# Patient Record
Sex: FEMALE | Race: WHITE | NOT HISPANIC OR LATINO | Employment: OTHER | URBAN - NONMETROPOLITAN AREA
[De-identification: names, ages, dates, MRNs, and addresses within clinical notes are randomized per-mention and may not be internally consistent; named-entity substitution may affect disease eponyms.]

---

## 2017-03-16 LAB
INR BLD: 1.41 (ref 0.88–1.18)
PROTHROMBIN TIME: 17.1 SEC (ref 12–14.6)

## 2017-08-04 LAB
BACTERIA: NEGATIVE /HPF
BILIRUBIN URINE: NEGATIVE
BLOOD, URINE: NEGATIVE
CLARITY: CLEAR
COLOR: YELLOW
EPITHELIAL CELLS, UA: 1 /HPF (ref 0–5)
GLUCOSE URINE: NEGATIVE MG/DL
HYALINE CASTS: 0 /HPF (ref 0–8)
INR BLD: 2.13 (ref 0.88–1.18)
KETONES, URINE: NEGATIVE MG/DL
LEUKOCYTE ESTERASE, URINE: ABNORMAL
NITRITE, URINE: NEGATIVE
PH UA: 6
PROTEIN UA: NEGATIVE MG/DL
PROTHROMBIN TIME: 24 SEC (ref 12–14.6)
RBC UA: 0 /HPF (ref 0–4)
SPECIFIC GRAVITY UA: 1.01
UROBILINOGEN, URINE: 0.2 E.U./DL
WBC UA: 2 /HPF (ref 0–5)

## 2017-08-06 LAB — URINE CULTURE, ROUTINE: NORMAL

## 2017-09-01 LAB
BACTERIA: ABNORMAL /HPF
BILIRUBIN URINE: ABNORMAL
BLOOD, URINE: ABNORMAL
CLARITY: ABNORMAL
COLOR: ABNORMAL
EPITHELIAL CELLS, UA: 1 /HPF (ref 0–5)
EPITHELIAL CELLS, UA: ABNORMAL /HPF
GLUCOSE URINE: NEGATIVE MG/DL
HYALINE CASTS: 1 /HPF (ref 0–8)
INR BLD: 2.52 (ref 0.88–1.18)
KETONES, URINE: ABNORMAL MG/DL
LEUKOCYTE ESTERASE, URINE: ABNORMAL
NITRITE, URINE: POSITIVE
PH UA: 5
PROTEIN UA: ABNORMAL MG/DL
PROTHROMBIN TIME: 27.4 SEC (ref 12–14.6)
RBC UA: 2 /HPF (ref 0–4)
SPECIFIC GRAVITY UA: 1.01
UROBILINOGEN, URINE: 1 E.U./DL
WBC UA: 359 /HPF (ref 0–5)
WBC UA: ABNORMAL /HPF (ref 0–5)

## 2017-09-03 LAB
ORGANISM: ABNORMAL
URINE CULTURE, ROUTINE: ABNORMAL
URINE CULTURE, ROUTINE: ABNORMAL

## 2017-10-27 LAB
ALBUMIN SERPL-MCNC: 4.3 G/DL (ref 3.5–5.2)
ALP BLD-CCNC: 67 U/L (ref 35–104)
ALT SERPL-CCNC: 12 U/L (ref 5–33)
ANION GAP SERPL CALCULATED.3IONS-SCNC: 9 MMOL/L (ref 7–19)
AST SERPL-CCNC: 19 U/L (ref 5–32)
BACTERIA: ABNORMAL /HPF
BASOPHILS ABSOLUTE: 0 K/UL (ref 0–0.2)
BASOPHILS RELATIVE PERCENT: 0.3 % (ref 0–1)
BILIRUB SERPL-MCNC: 0.3 MG/DL (ref 0.2–1.2)
BILIRUBIN URINE: NEGATIVE
BLOOD, URINE: NEGATIVE
BUN BLDV-MCNC: 20 MG/DL (ref 8–23)
CALCIUM SERPL-MCNC: 9 MG/DL (ref 8.2–9.6)
CHLORIDE BLD-SCNC: 104 MMOL/L (ref 98–111)
CLARITY: ABNORMAL
CO2: 30 MMOL/L (ref 22–29)
COLOR: ABNORMAL
CREAT SERPL-MCNC: 1.2 MG/DL (ref 0.5–0.9)
EOSINOPHILS ABSOLUTE: 0.1 K/UL (ref 0–0.6)
EOSINOPHILS RELATIVE PERCENT: 1.4 % (ref 0–5)
EPITHELIAL CELLS, UA: 0 /HPF (ref 0–5)
GFR NON-AFRICAN AMERICAN: 42
GLUCOSE BLD-MCNC: 93 MG/DL (ref 74–109)
GLUCOSE URINE: NEGATIVE MG/DL
HCT VFR BLD CALC: 39.5 % (ref 37–47)
HEMOGLOBIN: 12.1 G/DL (ref 12–16)
HYALINE CASTS: 1 /HPF (ref 0–8)
INR BLD: 1.1 (ref 0.88–1.18)
KETONES, URINE: NEGATIVE MG/DL
LEUKOCYTE ESTERASE, URINE: ABNORMAL
LYMPHOCYTES ABSOLUTE: 0.8 K/UL (ref 1.1–4.5)
LYMPHOCYTES RELATIVE PERCENT: 12.3 % (ref 20–40)
MCH RBC QN AUTO: 28.7 PG (ref 27–31)
MCHC RBC AUTO-ENTMCNC: 30.6 G/DL (ref 33–37)
MCV RBC AUTO: 93.8 FL (ref 81–99)
MONOCYTES ABSOLUTE: 0.5 K/UL (ref 0–0.9)
MONOCYTES RELATIVE PERCENT: 8.1 % (ref 0–10)
NEUTROPHILS ABSOLUTE: 5.1 K/UL (ref 1.5–7.5)
NEUTROPHILS RELATIVE PERCENT: 77.4 % (ref 50–65)
NITRITE, URINE: POSITIVE
PDW BLD-RTO: 14 % (ref 11.5–14.5)
PH UA: 6
PLATELET # BLD: 745 K/UL (ref 130–400)
PMV BLD AUTO: 10.1 FL (ref 9.4–12.3)
POTASSIUM SERPL-SCNC: 3.7 MMOL/L (ref 3.5–5)
PROTEIN UA: NEGATIVE MG/DL
PROTHROMBIN TIME: 14.1 SEC (ref 12–14.6)
RBC # BLD: 4.21 M/UL (ref 4.2–5.4)
RBC UA: 2 /HPF (ref 0–4)
SODIUM BLD-SCNC: 143 MMOL/L (ref 136–145)
SPECIFIC GRAVITY UA: 1.01
TOTAL PROTEIN: 7 G/DL (ref 6.6–8.7)
UROBILINOGEN, URINE: 1 E.U./DL
WBC # BLD: 6.6 K/UL (ref 4.8–10.8)
WBC UA: 130 /HPF (ref 0–5)

## 2017-10-29 LAB
ORGANISM: ABNORMAL
URINE CULTURE, ROUTINE: ABNORMAL
URINE CULTURE, ROUTINE: ABNORMAL

## 2018-01-31 LAB
BILIRUBIN URINE: NEGATIVE
BLOOD, URINE: NEGATIVE
CLARITY: CLEAR
COLOR: YELLOW
GLUCOSE URINE: NEGATIVE MG/DL
INR BLD: 1.1 (ref 0.88–1.18)
KETONES, URINE: NEGATIVE MG/DL
LEUKOCYTE ESTERASE, URINE: NEGATIVE
NITRITE, URINE: NEGATIVE
PH UA: 6
PROTEIN UA: NEGATIVE MG/DL
PROTHROMBIN TIME: 14.1 SEC (ref 12–14.6)
SPECIFIC GRAVITY UA: 1.01
UROBILINOGEN, URINE: 0.2 E.U./DL

## 2018-02-16 LAB
BACTERIA: NEGATIVE /HPF
BILIRUBIN URINE: NEGATIVE
BLOOD, URINE: NEGATIVE
CLARITY: CLEAR
COLOR: YELLOW
EPITHELIAL CELLS, UA: 4 /HPF (ref 0–5)
GLUCOSE URINE: NEGATIVE MG/DL
HYALINE CASTS: 2 /HPF (ref 0–8)
INR BLD: 1.21 (ref 0.88–1.18)
KETONES, URINE: NEGATIVE MG/DL
LEUKOCYTE ESTERASE, URINE: ABNORMAL
NITRITE, URINE: NEGATIVE
PH UA: 5.5
PROTEIN UA: NEGATIVE MG/DL
PROTHROMBIN TIME: 15.2 SEC (ref 12–14.6)
RBC UA: 1 /HPF (ref 0–4)
SPECIFIC GRAVITY UA: 1.01
URINE REFLEX TO CULTURE: YES
UROBILINOGEN, URINE: 0.2 E.U./DL
WBC UA: 14 /HPF (ref 0–5)

## 2018-02-18 LAB — URINE CULTURE, ROUTINE: NORMAL

## 2018-03-23 LAB
BACTERIA: NEGATIVE /HPF
BILIRUBIN URINE: NEGATIVE
BLOOD, URINE: NEGATIVE
CLARITY: CLEAR
COLOR: YELLOW
EPITHELIAL CELLS, UA: 1 /HPF (ref 0–5)
GLUCOSE URINE: NEGATIVE MG/DL
HYALINE CASTS: 6 /HPF (ref 0–8)
INR BLD: 1.2 (ref 0.88–1.18)
KETONES, URINE: NEGATIVE MG/DL
LEUKOCYTE ESTERASE, URINE: ABNORMAL
NITRITE, URINE: NEGATIVE
PH UA: 6
PROTEIN UA: NEGATIVE MG/DL
PROTHROMBIN TIME: 15.1 SEC (ref 12–14.6)
RBC UA: 1 /HPF (ref 0–4)
SPECIFIC GRAVITY UA: 1.02
UROBILINOGEN, URINE: 0.2 E.U./DL
WBC UA: 13 /HPF (ref 0–5)

## 2018-03-25 LAB — URINE CULTURE, ROUTINE: NORMAL

## 2018-07-13 LAB
BACTERIA: NEGATIVE /HPF
BILIRUBIN URINE: NEGATIVE
BLOOD, URINE: NEGATIVE
CLARITY: CLEAR
COLOR: YELLOW
EPITHELIAL CELLS, UA: 1 /HPF (ref 0–5)
GLUCOSE URINE: NEGATIVE MG/DL
HYALINE CASTS: 1 /HPF (ref 0–8)
INR BLD: 1.31 (ref 0.88–1.18)
KETONES, URINE: NEGATIVE MG/DL
LEUKOCYTE ESTERASE, URINE: ABNORMAL
NITRITE, URINE: NEGATIVE
PH UA: 6
PROTEIN UA: NEGATIVE MG/DL
PROTHROMBIN TIME: 16.2 SEC (ref 12–14.6)
RBC UA: 1 /HPF (ref 0–4)
SPECIFIC GRAVITY UA: 1.01
URINE REFLEX TO CULTURE: YES
UROBILINOGEN, URINE: 0.2 E.U./DL
WBC UA: 7 /HPF (ref 0–5)

## 2018-07-15 LAB
ORGANISM: ABNORMAL
URINE CULTURE, ROUTINE: ABNORMAL
URINE CULTURE, ROUTINE: ABNORMAL

## 2018-08-03 LAB
ALBUMIN SERPL-MCNC: 4.4 G/DL (ref 3.5–5.2)
ALP BLD-CCNC: 59 U/L (ref 35–104)
ALT SERPL-CCNC: 10 U/L (ref 5–33)
ANION GAP SERPL CALCULATED.3IONS-SCNC: 17 MMOL/L (ref 7–19)
AST SERPL-CCNC: 17 U/L (ref 5–32)
BACTERIA: ABNORMAL /HPF
BASOPHILS ABSOLUTE: 0 K/UL (ref 0–0.2)
BASOPHILS RELATIVE PERCENT: 0.1 % (ref 0–1)
BILIRUB SERPL-MCNC: 0.3 MG/DL (ref 0.2–1.2)
BILIRUBIN URINE: NEGATIVE
BLOOD, URINE: NEGATIVE
BUN BLDV-MCNC: 20 MG/DL (ref 8–23)
CALCIUM SERPL-MCNC: 9.2 MG/DL (ref 8.2–9.6)
CHLORIDE BLD-SCNC: 102 MMOL/L (ref 98–111)
CLARITY: ABNORMAL
CO2: 24 MMOL/L (ref 22–29)
COLOR: YELLOW
CREAT SERPL-MCNC: 1.1 MG/DL (ref 0.5–0.9)
EOSINOPHILS ABSOLUTE: 0.1 K/UL (ref 0–0.6)
EOSINOPHILS RELATIVE PERCENT: 1.9 % (ref 0–5)
EPITHELIAL CELLS, UA: 0 /HPF (ref 0–5)
GFR NON-AFRICAN AMERICAN: 46
GLUCOSE BLD-MCNC: 125 MG/DL (ref 74–109)
GLUCOSE URINE: NEGATIVE MG/DL
HCT VFR BLD CALC: 36.4 % (ref 37–47)
HEMOGLOBIN: 11.7 G/DL (ref 12–16)
HYALINE CASTS: 2 /HPF (ref 0–8)
INR BLD: 1.15 (ref 0.88–1.18)
KETONES, URINE: NEGATIVE MG/DL
LEUKOCYTE ESTERASE, URINE: ABNORMAL
LYMPHOCYTES ABSOLUTE: 0.8 K/UL (ref 1.1–4.5)
LYMPHOCYTES RELATIVE PERCENT: 11 % (ref 20–40)
MCH RBC QN AUTO: 29.3 PG (ref 27–31)
MCHC RBC AUTO-ENTMCNC: 32.1 G/DL (ref 33–37)
MCV RBC AUTO: 91.2 FL (ref 81–99)
MONOCYTES ABSOLUTE: 0.5 K/UL (ref 0–0.9)
MONOCYTES RELATIVE PERCENT: 7.8 % (ref 0–10)
NEUTROPHILS ABSOLUTE: 5.5 K/UL (ref 1.5–7.5)
NEUTROPHILS RELATIVE PERCENT: 78.9 % (ref 50–65)
NITRITE, URINE: NEGATIVE
PDW BLD-RTO: 14.4 % (ref 11.5–14.5)
PH UA: 6
PLATELET # BLD: 606 K/UL (ref 130–400)
PMV BLD AUTO: 10 FL (ref 9.4–12.3)
POTASSIUM SERPL-SCNC: 4 MMOL/L (ref 3.5–5)
PROTEIN UA: NEGATIVE MG/DL
PROTHROMBIN TIME: 14.6 SEC (ref 12–14.6)
RBC # BLD: 3.99 M/UL (ref 4.2–5.4)
RBC UA: 1 /HPF (ref 0–4)
SODIUM BLD-SCNC: 143 MMOL/L (ref 136–145)
SPECIFIC GRAVITY UA: 1.02
TOTAL PROTEIN: 6.4 G/DL (ref 6.6–8.7)
UROBILINOGEN, URINE: 0.2 E.U./DL
WBC # BLD: 6.9 K/UL (ref 4.8–10.8)
WBC UA: 68 /HPF (ref 0–5)

## 2018-08-24 LAB
INR BLD: 1.19 (ref 0.88–1.18)
PROTHROMBIN TIME: 15 SEC (ref 12–14.6)

## 2018-09-07 LAB
BILIRUBIN URINE: NEGATIVE
BLOOD, URINE: NEGATIVE
CLARITY: CLEAR
COLOR: YELLOW
GLUCOSE URINE: NEGATIVE MG/DL
INR BLD: 1.35 (ref 0.88–1.18)
KETONES, URINE: NEGATIVE MG/DL
LEUKOCYTE ESTERASE, URINE: NEGATIVE
NITRITE, URINE: NEGATIVE
PH UA: 6
PROTEIN UA: NEGATIVE MG/DL
PROTHROMBIN TIME: 16.6 SEC (ref 12–14.6)
SPECIFIC GRAVITY UA: 1.02
URINE REFLEX TO CULTURE: NORMAL
UROBILINOGEN, URINE: 0.2 E.U./DL

## 2018-10-12 LAB
BILIRUBIN URINE: NEGATIVE
BLOOD, URINE: NEGATIVE
CLARITY: CLEAR
COLOR: YELLOW
GLUCOSE URINE: NEGATIVE MG/DL
INR BLD: 1.4 (ref 0.88–1.18)
KETONES, URINE: NEGATIVE MG/DL
LEUKOCYTE ESTERASE, URINE: NEGATIVE
NITRITE, URINE: NEGATIVE
PH UA: 6
PROTEIN UA: NEGATIVE MG/DL
PROTHROMBIN TIME: 17.1 SEC (ref 12–14.6)
SPECIFIC GRAVITY UA: 1.01
URINE REFLEX TO CULTURE: NORMAL
UROBILINOGEN, URINE: 0.2 E.U./DL

## 2019-01-04 LAB
ANION GAP SERPL CALCULATED.3IONS-SCNC: 13 MMOL/L (ref 7–19)
BASOPHILS ABSOLUTE: 0 K/UL (ref 0–0.2)
BASOPHILS RELATIVE PERCENT: 0.1 % (ref 0–1)
BILIRUBIN URINE: NEGATIVE
BLOOD, URINE: NEGATIVE
BUN BLDV-MCNC: 18 MG/DL (ref 8–23)
CALCIUM SERPL-MCNC: 9 MG/DL (ref 8.2–9.6)
CHLORIDE BLD-SCNC: 102 MMOL/L (ref 98–111)
CLARITY: CLEAR
CO2: 29 MMOL/L (ref 22–29)
COLOR: YELLOW
CREAT SERPL-MCNC: 1.1 MG/DL (ref 0.5–0.9)
EOSINOPHILS ABSOLUTE: 0.1 K/UL (ref 0–0.6)
EOSINOPHILS RELATIVE PERCENT: 1.5 % (ref 0–5)
GFR NON-AFRICAN AMERICAN: 46
GLUCOSE BLD-MCNC: 102 MG/DL (ref 74–109)
GLUCOSE URINE: NEGATIVE MG/DL
HCT VFR BLD CALC: 38.7 % (ref 37–47)
HEMOGLOBIN: 12 G/DL (ref 12–16)
INR BLD: 1.64 (ref 0.88–1.18)
KETONES, URINE: NEGATIVE MG/DL
LEUKOCYTE ESTERASE, URINE: NEGATIVE
LYMPHOCYTES ABSOLUTE: 0.7 K/UL (ref 1.1–4.5)
LYMPHOCYTES RELATIVE PERCENT: 9.1 % (ref 20–40)
MCH RBC QN AUTO: 28.6 PG (ref 27–31)
MCHC RBC AUTO-ENTMCNC: 31 G/DL (ref 33–37)
MCV RBC AUTO: 92.1 FL (ref 81–99)
MONOCYTES ABSOLUTE: 0.6 K/UL (ref 0–0.9)
MONOCYTES RELATIVE PERCENT: 7.9 % (ref 0–10)
NEUTROPHILS ABSOLUTE: 6.6 K/UL (ref 1.5–7.5)
NEUTROPHILS RELATIVE PERCENT: 81 % (ref 50–65)
NITRITE, URINE: NEGATIVE
PDW BLD-RTO: 14.5 % (ref 11.5–14.5)
PH UA: 6
PLATELET # BLD: 601 K/UL (ref 130–400)
PMV BLD AUTO: 10.2 FL (ref 9.4–12.3)
POTASSIUM SERPL-SCNC: 4.1 MMOL/L (ref 3.5–5)
PROTEIN UA: NEGATIVE MG/DL
PROTHROMBIN TIME: 18.7 SEC (ref 12–14.6)
RBC # BLD: 4.2 M/UL (ref 4.2–5.4)
SODIUM BLD-SCNC: 144 MMOL/L (ref 136–145)
SPECIFIC GRAVITY UA: 1.01
UROBILINOGEN, URINE: 0.2 E.U./DL
WBC # BLD: 8.1 K/UL (ref 4.8–10.8)

## 2019-02-01 LAB
BACTERIA: ABNORMAL /HPF
BILIRUBIN URINE: NEGATIVE
BLOOD, URINE: NEGATIVE
CLARITY: ABNORMAL
COLOR: YELLOW
EPITHELIAL CELLS, UA: 0 /HPF (ref 0–5)
GLUCOSE URINE: NEGATIVE MG/DL
HYALINE CASTS: 1 /HPF (ref 0–8)
INR BLD: 1.45 (ref 0.88–1.18)
KETONES, URINE: NEGATIVE MG/DL
LEUKOCYTE ESTERASE, URINE: ABNORMAL
NITRITE, URINE: NEGATIVE
PH UA: 6
PROTEIN UA: NEGATIVE MG/DL
PROTHROMBIN TIME: 17 SEC (ref 12–14.6)
RBC UA: 1 /HPF (ref 0–4)
SPECIFIC GRAVITY UA: 1.01
URINE REFLEX TO CULTURE: YES
UROBILINOGEN, URINE: 0.2 E.U./DL
WBC UA: 63 /HPF (ref 0–5)

## 2019-02-04 LAB
ORGANISM: ABNORMAL
URINE CULTURE, ROUTINE: ABNORMAL
URINE CULTURE, ROUTINE: ABNORMAL

## 2019-03-01 LAB
BACTERIA: NEGATIVE /HPF
BILIRUBIN URINE: NEGATIVE
BLOOD, URINE: ABNORMAL
CLARITY: ABNORMAL
COLOR: YELLOW
EPITHELIAL CELLS, UA: 0 /HPF (ref 0–5)
GLUCOSE URINE: NEGATIVE MG/DL
HYALINE CASTS: 1 /HPF (ref 0–8)
INR BLD: 1.38 (ref 0.88–1.18)
KETONES, URINE: NEGATIVE MG/DL
LEUKOCYTE ESTERASE, URINE: ABNORMAL
NITRITE, URINE: NEGATIVE
PH UA: 5.5
PROTEIN UA: NEGATIVE MG/DL
PROTHROMBIN TIME: 16.3 SEC (ref 12–14.6)
RBC UA: 1 /HPF (ref 0–4)
SPECIFIC GRAVITY UA: 1.01
URINE REFLEX TO CULTURE: YES
UROBILINOGEN, URINE: 0.2 E.U./DL
WBC UA: 84 /HPF (ref 0–5)

## 2019-03-03 LAB
ORGANISM: ABNORMAL
URINE CULTURE, ROUTINE: ABNORMAL
URINE CULTURE, ROUTINE: ABNORMAL

## 2019-03-04 ENCOUNTER — APPOINTMENT (OUTPATIENT)
Dept: CT IMAGING | Facility: HOSPITAL | Age: 84
End: 2019-03-04

## 2019-03-04 ENCOUNTER — HOSPITAL ENCOUNTER (OUTPATIENT)
Facility: HOSPITAL | Age: 84
Setting detail: OBSERVATION
Discharge: HOME HEALTH | End: 2019-03-06
Attending: FAMILY MEDICINE | Admitting: FAMILY MEDICINE

## 2019-03-04 PROBLEM — B96.29 URINARY TRACT INFECTION DUE TO EXTENDED-SPECTRUM BETA LACTAMASE (ESBL) PRODUCING ESCHERICHIA COLI: Status: ACTIVE | Noted: 2019-03-04

## 2019-03-04 PROBLEM — Z16.12 URINARY TRACT INFECTION DUE TO EXTENDED-SPECTRUM BETA LACTAMASE (ESBL) PRODUCING ESCHERICHIA COLI: Status: ACTIVE | Noted: 2019-03-04

## 2019-03-04 PROBLEM — C18.9 COLON CANCER (HCC): Chronic | Status: ACTIVE | Noted: 2019-03-04

## 2019-03-04 PROBLEM — H93.19 SUBJECTIVE TINNITUS: Chronic | Status: ACTIVE | Noted: 2019-03-04

## 2019-03-04 PROBLEM — D75.9 DISEASE OF BLOOD AND BLOOD FORMING ORGAN: Chronic | Status: ACTIVE | Noted: 2019-03-04

## 2019-03-04 PROBLEM — I82.4Z3: Chronic | Status: ACTIVE | Noted: 2019-03-04

## 2019-03-04 PROBLEM — M81.0 OSTEOPOROSIS, POST-MENOPAUSAL: Chronic | Status: ACTIVE | Noted: 2019-03-04

## 2019-03-04 PROBLEM — D53.9 SIMPLE CHRONIC ANEMIA: Chronic | Status: ACTIVE | Noted: 2019-03-04

## 2019-03-04 PROBLEM — N18.2 CHRONIC KIDNEY DISEASE, STAGE II (MILD): Chronic | Status: ACTIVE | Noted: 2019-03-04

## 2019-03-04 PROBLEM — R42 DIZZINESS AND GIDDINESS: Chronic | Status: ACTIVE | Noted: 2019-03-04

## 2019-03-04 PROBLEM — K21.9 ESOPHAGEAL REFLUX: Chronic | Status: ACTIVE | Noted: 2019-03-04

## 2019-03-04 PROBLEM — G25.2 ACTION TREMOR: Chronic | Status: ACTIVE | Noted: 2019-03-04

## 2019-03-04 PROBLEM — N39.0 URINARY TRACT INFECTION DUE TO EXTENDED-SPECTRUM BETA LACTAMASE (ESBL) PRODUCING ESCHERICHIA COLI: Status: ACTIVE | Noted: 2019-03-04

## 2019-03-04 LAB
ANION GAP SERPL CALCULATED.3IONS-SCNC: 4 MMOL/L (ref 4–13)
BACTERIA UR QL AUTO: ABNORMAL /HPF
BASOPHILS # BLD AUTO: 0.02 10*3/MM3 (ref 0–0.2)
BASOPHILS NFR BLD AUTO: 0.4 % (ref 0–2)
BILIRUB UR QL STRIP: NEGATIVE
BUN BLD-MCNC: 21 MG/DL (ref 5–21)
BUN/CREAT SERPL: 19.4 (ref 7–25)
CALCIUM SPEC-SCNC: 8.7 MG/DL (ref 8.4–10.4)
CHLORIDE SERPL-SCNC: 107 MMOL/L (ref 98–110)
CLARITY UR: ABNORMAL
CO2 SERPL-SCNC: 29 MMOL/L (ref 24–31)
COLOR UR: YELLOW
CREAT BLD-MCNC: 1.08 MG/DL (ref 0.5–1.4)
DEPRECATED RDW RBC AUTO: 51.7 FL (ref 40–54)
EOSINOPHIL # BLD AUTO: 0.11 10*3/MM3 (ref 0–0.7)
EOSINOPHIL NFR BLD AUTO: 2.1 % (ref 0–4)
ERYTHROCYTE [DISTWIDTH] IN BLOOD BY AUTOMATED COUNT: 15.8 % (ref 12–15)
GFR SERPL CREATININE-BSD FRML MDRD: 47 ML/MIN/1.73
GLUCOSE BLD-MCNC: 89 MG/DL (ref 70–100)
GLUCOSE UR STRIP-MCNC: NEGATIVE MG/DL
HCT VFR BLD AUTO: 34.6 % (ref 37–47)
HGB BLD-MCNC: 11 G/DL (ref 12–16)
HGB UR QL STRIP.AUTO: ABNORMAL
HYALINE CASTS UR QL AUTO: ABNORMAL /LPF
IMM GRANULOCYTES # BLD AUTO: 0.02 10*3/MM3 (ref 0–0.05)
IMM GRANULOCYTES NFR BLD AUTO: 0.4 % (ref 0–5)
KETONES UR QL STRIP: NEGATIVE
LEUKOCYTE ESTERASE UR QL STRIP.AUTO: ABNORMAL
LYMPHOCYTES # BLD AUTO: 0.7 10*3/MM3 (ref 0.72–4.86)
LYMPHOCYTES NFR BLD AUTO: 13.2 % (ref 15–45)
MCH RBC QN AUTO: 28.5 PG (ref 28–32)
MCHC RBC AUTO-ENTMCNC: 31.8 G/DL (ref 33–36)
MCV RBC AUTO: 89.6 FL (ref 82–98)
MONOCYTES # BLD AUTO: 0.4 10*3/MM3 (ref 0.19–1.3)
MONOCYTES NFR BLD AUTO: 7.6 % (ref 4–12)
NEUTROPHILS # BLD AUTO: 4.04 10*3/MM3 (ref 1.87–8.4)
NEUTROPHILS NFR BLD AUTO: 76.3 % (ref 39–78)
NITRITE UR QL STRIP: POSITIVE
NRBC BLD AUTO-RTO: 0 /100 WBC (ref 0–0)
PH UR STRIP.AUTO: <=5 [PH] (ref 5–8)
PLATELET # BLD AUTO: 567 10*3/MM3 (ref 130–400)
PMV BLD AUTO: 9.9 FL (ref 6–12)
POTASSIUM BLD-SCNC: 4.1 MMOL/L (ref 3.5–5.3)
PROT UR QL STRIP: ABNORMAL
RBC # BLD AUTO: 3.86 10*6/MM3 (ref 4.2–5.4)
RBC # UR: ABNORMAL /HPF
REF LAB TEST METHOD: ABNORMAL
SODIUM BLD-SCNC: 140 MMOL/L (ref 135–145)
SP GR UR STRIP: 1.01 (ref 1–1.03)
SQUAMOUS #/AREA URNS HPF: ABNORMAL /HPF
UROBILINOGEN UR QL STRIP: ABNORMAL
WBC NRBC COR # BLD: 5.29 10*3/MM3 (ref 4.8–10.8)
WBC UR QL AUTO: ABNORMAL /HPF

## 2019-03-04 PROCEDURE — 25010000002 MEROPENEM PER 100 MG: Performed by: FAMILY MEDICINE

## 2019-03-04 PROCEDURE — 74176 CT ABD & PELVIS W/O CONTRAST: CPT

## 2019-03-04 PROCEDURE — G0378 HOSPITAL OBSERVATION PER HR: HCPCS

## 2019-03-04 PROCEDURE — 94799 UNLISTED PULMONARY SVC/PX: CPT

## 2019-03-04 PROCEDURE — 96366 THER/PROPH/DIAG IV INF ADDON: CPT

## 2019-03-04 PROCEDURE — 87086 URINE CULTURE/COLONY COUNT: CPT | Performed by: INTERNAL MEDICINE

## 2019-03-04 PROCEDURE — 81001 URINALYSIS AUTO W/SCOPE: CPT | Performed by: INTERNAL MEDICINE

## 2019-03-04 PROCEDURE — 85025 COMPLETE CBC W/AUTO DIFF WBC: CPT | Performed by: FAMILY MEDICINE

## 2019-03-04 PROCEDURE — 96365 THER/PROPH/DIAG IV INF INIT: CPT

## 2019-03-04 PROCEDURE — 80048 BASIC METABOLIC PNL TOTAL CA: CPT | Performed by: FAMILY MEDICINE

## 2019-03-04 PROCEDURE — 94760 N-INVAS EAR/PLS OXIMETRY 1: CPT

## 2019-03-04 RX ORDER — CLONIDINE HYDROCHLORIDE 0.1 MG/1
0.1 TABLET ORAL EVERY 8 HOURS PRN
Status: DISCONTINUED | OUTPATIENT
Start: 2019-03-04 | End: 2019-03-06 | Stop reason: HOSPADM

## 2019-03-04 RX ORDER — ALPRAZOLAM 0.25 MG/1
0.25 TABLET ORAL NIGHTLY PRN
Status: ON HOLD | COMMUNITY
End: 2022-02-24

## 2019-03-04 RX ORDER — CLONIDINE HYDROCHLORIDE 0.1 MG/1
0.1 TABLET ORAL EVERY 8 HOURS PRN
Status: ON HOLD | COMMUNITY
End: 2019-09-09

## 2019-03-04 RX ORDER — SODIUM CHLORIDE 0.9 % (FLUSH) 0.9 %
3-10 SYRINGE (ML) INJECTION AS NEEDED
Status: DISCONTINUED | OUTPATIENT
Start: 2019-03-04 | End: 2019-03-06 | Stop reason: HOSPADM

## 2019-03-04 RX ORDER — RANOLAZINE 500 MG/1
500 TABLET, EXTENDED RELEASE ORAL 2 TIMES DAILY
COMMUNITY

## 2019-03-04 RX ORDER — MECLIZINE HCL 12.5 MG/1
12.5 TABLET ORAL 3 TIMES DAILY PRN
COMMUNITY

## 2019-03-04 RX ORDER — GABAPENTIN 100 MG/1
100 CAPSULE ORAL
COMMUNITY

## 2019-03-04 RX ORDER — WARFARIN SODIUM 3 MG/1
3 TABLET ORAL
Status: DISCONTINUED | OUTPATIENT
Start: 2019-03-04 | End: 2019-03-06 | Stop reason: HOSPADM

## 2019-03-04 RX ORDER — SODIUM CHLORIDE 0.9 % (FLUSH) 0.9 %
3 SYRINGE (ML) INJECTION EVERY 12 HOURS SCHEDULED
Status: DISCONTINUED | OUTPATIENT
Start: 2019-03-04 | End: 2019-03-06 | Stop reason: HOSPADM

## 2019-03-04 RX ORDER — RALOXIFENE HYDROCHLORIDE 60 MG/1
60 TABLET, FILM COATED ORAL NIGHTLY
Status: ON HOLD | COMMUNITY
End: 2022-02-23

## 2019-03-04 RX ORDER — MONTELUKAST SODIUM 4 MG/1
1 TABLET, CHEWABLE ORAL
Status: DISCONTINUED | OUTPATIENT
Start: 2019-03-04 | End: 2019-03-06 | Stop reason: HOSPADM

## 2019-03-04 RX ORDER — ACETAMINOPHEN 325 MG/1
650 TABLET ORAL EVERY 4 HOURS PRN
Status: DISCONTINUED | OUTPATIENT
Start: 2019-03-04 | End: 2019-03-06 | Stop reason: HOSPADM

## 2019-03-04 RX ORDER — MECLIZINE HCL 12.5 MG/1
12.5 TABLET ORAL 3 TIMES DAILY PRN
Status: DISCONTINUED | OUTPATIENT
Start: 2019-03-04 | End: 2019-03-06 | Stop reason: HOSPADM

## 2019-03-04 RX ORDER — HYDROCODONE BITARTRATE AND ACETAMINOPHEN 5; 325 MG/1; MG/1
1 TABLET ORAL EVERY 4 HOURS PRN
Status: DISCONTINUED | OUTPATIENT
Start: 2019-03-04 | End: 2019-03-06 | Stop reason: HOSPADM

## 2019-03-04 RX ORDER — METOPROLOL TARTRATE 50 MG/1
50 TABLET, FILM COATED ORAL 2 TIMES DAILY
Status: DISCONTINUED | OUTPATIENT
Start: 2019-03-04 | End: 2019-03-06 | Stop reason: HOSPADM

## 2019-03-04 RX ORDER — METOPROLOL TARTRATE 100 MG/1
50 TABLET ORAL 4 TIMES DAILY
Status: ON HOLD | COMMUNITY
End: 2022-02-24

## 2019-03-04 RX ORDER — WARFARIN SODIUM 3 MG/1
4.5 TABLET ORAL 2 TIMES WEEKLY
Status: ON HOLD | COMMUNITY
End: 2019-09-09

## 2019-03-04 RX ORDER — TRIAMTERENE AND HYDROCHLOROTHIAZIDE 37.5; 25 MG/1; MG/1
1 TABLET ORAL EVERY OTHER DAY
Status: DISCONTINUED | OUTPATIENT
Start: 2019-03-05 | End: 2019-03-06 | Stop reason: HOSPADM

## 2019-03-04 RX ORDER — MONTELUKAST SODIUM 4 MG/1
1 TABLET, CHEWABLE ORAL 3 TIMES DAILY
COMMUNITY

## 2019-03-04 RX ORDER — RALOXIFENE HYDROCHLORIDE 60 MG/1
60 TABLET, FILM COATED ORAL DAILY
Status: DISCONTINUED | OUTPATIENT
Start: 2019-03-04 | End: 2019-03-06 | Stop reason: HOSPADM

## 2019-03-04 RX ORDER — ESOMEPRAZOLE MAGNESIUM 40 MG/1
40 CAPSULE, DELAYED RELEASE ORAL
COMMUNITY

## 2019-03-04 RX ORDER — CHOLESTYRAMINE LIGHT 4 G/5.7G
1 POWDER, FOR SUSPENSION ORAL EVERY 8 HOURS SCHEDULED
Status: DISCONTINUED | OUTPATIENT
Start: 2019-03-04 | End: 2019-03-04

## 2019-03-04 RX ORDER — PANTOPRAZOLE SODIUM 40 MG/1
40 TABLET, DELAYED RELEASE ORAL
Status: DISCONTINUED | OUTPATIENT
Start: 2019-03-05 | End: 2019-03-06 | Stop reason: HOSPADM

## 2019-03-04 RX ORDER — RALOXIFENE HYDROCHLORIDE 60 MG/1
60 TABLET, FILM COATED ORAL DAILY
Status: DISCONTINUED | OUTPATIENT
Start: 2019-03-04 | End: 2019-03-04

## 2019-03-04 RX ORDER — TRIAMTERENE AND HYDROCHLOROTHIAZIDE 37.5; 25 MG/1; MG/1
1 CAPSULE ORAL DAILY
COMMUNITY
End: 2022-02-26 | Stop reason: HOSPADM

## 2019-03-04 RX ORDER — GABAPENTIN 100 MG/1
100 CAPSULE ORAL NIGHTLY
Status: DISCONTINUED | OUTPATIENT
Start: 2019-03-04 | End: 2019-03-06 | Stop reason: HOSPADM

## 2019-03-04 RX ORDER — RANOLAZINE 500 MG/1
500 TABLET, EXTENDED RELEASE ORAL 2 TIMES DAILY
Status: DISCONTINUED | OUTPATIENT
Start: 2019-03-04 | End: 2019-03-06 | Stop reason: HOSPADM

## 2019-03-04 RX ORDER — WARFARIN SODIUM 3 MG/1
3 TABLET ORAL DAILY
COMMUNITY

## 2019-03-04 RX ADMIN — RALOXIFENE HYDROCHLORIDE 60 MG: 60 TABLET, FILM COATED ORAL at 17:55

## 2019-03-04 RX ADMIN — RANOLAZINE 500 MG: 500 TABLET, FILM COATED, EXTENDED RELEASE ORAL at 21:39

## 2019-03-04 RX ADMIN — WARFARIN SODIUM 3 MG: 3 TABLET ORAL at 17:55

## 2019-03-04 RX ADMIN — MEROPENEM 1 G: 1 INJECTION, POWDER, FOR SOLUTION INTRAVENOUS at 21:39

## 2019-03-04 RX ADMIN — MEROPENEM 1 G: 1 INJECTION, POWDER, FOR SOLUTION INTRAVENOUS at 15:21

## 2019-03-04 RX ADMIN — METOPROLOL TARTRATE 50 MG: 50 TABLET ORAL at 21:39

## 2019-03-04 RX ADMIN — SODIUM CHLORIDE, PRESERVATIVE FREE 3 ML: 5 INJECTION INTRAVENOUS at 21:40

## 2019-03-04 RX ADMIN — GABAPENTIN 100 MG: 100 CAPSULE ORAL at 21:40

## 2019-03-04 RX ADMIN — SODIUM CHLORIDE, PRESERVATIVE FREE 3 ML: 5 INJECTION INTRAVENOUS at 15:22

## 2019-03-04 NOTE — PROGRESS NOTES
Pharmacy Dosing Service  Antimicrobials  Meropenem    Assessment/Action/Plan:  Start Meropenem 1 gram iv once (infused over 30 minutes) then Meropenem 1 gram iv q12h (infused over 3 hours) for UTI . Pharmacy will continue to monitor renal function and adjust accordingly.     Subjective:  Sana Laboy is a 89 y.o. female currently being treated for UTI.    Objective:  Estimated Creatinine Clearance: 30.7 mL/min (by C-G formula based on SCr of 1.08 mg/dL).   Lab Results   Component Value Date    CREATININE 1.08 03/04/2019    CREATININE 0.84 03/30/2016    CREATININE 0.89 03/29/2016    CREATININE 0.86 03/29/2016     Lab Results   Component Value Date    WBC 5.29 03/04/2019     Temp Readings from Last 1 Encounters:   03/04/19 97.3 °F (36.3 °C) (Oral)       Culture Results:  Microbiology Results (last 10 days)       ** No results found for the last 240 hours. **          Current Antimicrobials:   Anti-Infectives (From admission, onward)      Ordered     Dose/Rate Route Frequency Start Stop    03/04/19 1442  meropenem (MERREM) 1 g/100 mL 0.9% NS VTB (mbp)     Ordering Provider:  Aaron Lyons MD    1 g  over 3 Hours Intravenous Every 12 Hours 03/04/19 2130 03/09/19 2129 03/04/19 1442  meropenem (MERREM) 1 g/100 mL 0.9% NS VTB (mbp)     Ordering Provider:  Aaron Lyons MD    1 g  over 30 Minutes Intravenous Once 03/04/19 1530              Jessy Wakefield [facility]  03/04/19 2:42 PM

## 2019-03-04 NOTE — PLAN OF CARE
Problem: Patient Care Overview  Goal: Plan of Care Review  Outcome: Ongoing (interventions implemented as appropriate)   03/04/19 7865   Coping/Psychosocial   Plan of Care Reviewed With patient;daughter   Plan of Care Review   Progress no change   OTHER   Outcome Summary Direct admit today from Dr. Lyons office with UTI. IID. IV abx given per order. Up to chair today. Voided. Tolerating regular diet. VSS. Cont to monitor.      Goal: Individualization and Mutuality  Outcome: Ongoing (interventions implemented as appropriate)    Goal: Discharge Needs Assessment  Outcome: Ongoing (interventions implemented as appropriate)      Problem: Fall Risk (Adult)  Goal: Identify Related Risk Factors and Signs and Symptoms  Outcome: Outcome(s) achieved Date Met: 03/04/19    Goal: Absence of Fall  Outcome: Ongoing (interventions implemented as appropriate)      Problem: Urinary Tract Infection (Adult)  Goal: Signs and Symptoms of Listed Potential Problems Will be Absent, Minimized or Managed (Urinary Tract Infection)  Outcome: Ongoing (interventions implemented as appropriate)

## 2019-03-04 NOTE — CONSULTS
INFECTIOUS DISEASES CONSULT NOTE    Patient:  Sana Laboy 89 y.o. female  ROOM # 360/1  YOB: 1924  MRN: [id number]  CSN:  [id number]  Admit date: 3/4/2019   Admitting Physician: Aaron Lyons MD  Primary Care Physician: Aaron Lyons MD  REFERRING PROVIDER: Aaron yLons MD    Reason for Consultation: Recommendations for antibiotic management of ESBL positive E. coli UTI.    History of Present Illness/Chief Complaint: Mitchell 89-year-old woman.  She indicates some problems with recurrent UTI.  She typically will get symptoms of urgency and incontinence.  She will also experienced some dysuria.  She indicates for the past few months she has had a UTI that has been hard to eradicate.  She had recently completed a course of oral antibiotic therapy.  Last week she developed some recurrent symptoms suggestive of UTI.  Urine obtained via clean catch the end of last week was sent to [facility].  Culture grew ESBL positive E. coli.  She had grown the same potential pathogen about a month previously.  She was admitted to the hospital for institution of intravenous antibiotic therapy and further workup.  She has not had any recent CT or bladder/urine imaging.  Infectious disease asked to evaluate and offer recommendations.    Current Scheduled Medications:     gabapentin 100 mg Oral Nightly   meropenem 1 g Intravenous Q12H   metoprolol tartrate 50 mg Oral BID   [START ON 3/5/2019] pantoprazole 40 mg Oral Q AM   PATIENT SUPPLIED MEDICATION 1 g Oral Daily With Breakfast, Lunch & Dinner   raloxifene 60 mg Oral Daily   ranolazine 500 mg Oral BID   sodium chloride 3 mL Intravenous Q12H   [START ON 3/5/2019] triamterene-hydrochlorothiazide 1 tablet Oral Every Other Day   warfarin 3 mg Oral Once per day on Sun Mon Wed Fri Sat   [START ON 3/5/2019] warfarin 4.5 mg Oral Once per day on Tue Thu     Current PRN Medications:  •  acetaminophen  •  CloNIDine  •  HYDROcodone-acetaminophen  •   "meclizine  •  sodium chloride    Allergies:    Allergies   Allergen Reactions   • Ciprofloxacin Diarrhea   • Darvon [Propoxyphene] Nausea And Vomiting   • Bactrim [Sulfamethoxazole-Trimethoprim] Rash     Past Medical History: Hypertension.  Colon cancer.  Venous thrombosis.    Past Surgical History: Previous partial colectomy.  IVC filter placement.    Social History: No tobacco or alcohol use.  Lives alone with support of family.      Family History: Noncontributory    Exposure History: No close contacts have been ill Abdomen soft and nontender.    Review of Systems   No cardiopulmonary symptoms  No diarrhea    Vital Signs:  /59 (BP Location: Right arm, Patient Position: Lying)   Pulse 74   Temp 97.2 °F (36.2 °C) (Oral)   Resp 18   Ht 160 cm (63\")   Wt 61.1 kg (134 lb 9.6 oz)   SpO2 97%   BMI 23.84 kg/m²  Temp (24hrs), Av.3 °F (36.3 °C), Min:97.2 °F (36.2 °C), Max:97.3 °F (36.3 °C)    Physical Exam  Vital signs reviewed.  Alert, oriented, pleasant, nontoxic-appearing.  Lungs clear without crackles  Heart without murmur  No suprapubic tenderness.  No flank tenderness.    Lab Results:  CBC: Results from last 7 days   Lab Units 19  1254   WBC 10*3/mm3 5.29   HEMOGLOBIN g/dL 11.0*   HEMATOCRIT % 34.6*   PLATELETS 10*3/mm3 567*     CMP: Results from last 7 days   Lab Units 19  1254   SODIUM mmol/L 140   POTASSIUM mmol/L 4.1   CHLORIDE mmol/L 107   CO2 mmol/L 29.0   BUN mg/dL 21   CREATININE mg/dL 1.08   CALCIUM mg/dL 8.7   GLUCOSE mg/dL 89     Radiology: None    Additional Studies Reviewed:   Per review of epic there appears to be a urinalysis at [facility] on 2019.  There were 84 white blood cells per high-power field.  Urine culture on 2019 grew ESBL positive E. coli which was susceptible to meropenem and to gentamicin    Impression:   ESBL positive E. coli UTI-she has recovered the same pathogen from urine over her last 2 urine cultures.    Recommendations:  "   Would suggest in out catheterization to make sure we have accurate urinalysis and urine culture  Would continue empiric therapy with meropenem  We will check CT scan of the abdomen and pelvis without contrast using  protocol to evaluate for any renal or bladder stone  Await and out cath urinalysis and culture results  We will explore the possibility of outpatient ertapenem 1 g daily  Continue to follow    Danie Johnson MD  03/04/19  5:30 PM

## 2019-03-04 NOTE — H&P
Patient Care Team:  Aaron Lyons MD as PCP - General  Aaron Lyons MD as PCP - Family Medicine    Chief complaint urinary tract infection    Subjective     Patient is a 89 y.o. female presents with history of multiple urinary tract infections in the past.  They have generally all been amendable to oral antibiotics.  She began having symptoms of a UTI including urinary frequency and urgency.  Arrangements were made for an outpatient urinalysis and culture.  I received the results of this today and it showed an E. coli species that is ESBL producing only sensitive to IV antibiotics. Onset of symptoms was gradual starting a few days ago.  Symptoms are associated with frequency and urgency.  Symptoms are aggravated by nothing in particular.   Symptoms improve with fluid intake. Severity mild.  Context related to multiple episodes of UTIs in the past.     Review of Systems   Pertinent items are noted in HPI, all other systems reviewed and negative    History  Past Medical History:   Diagnosis Date   • Cancer (CMS/[facility])    • Elevated cholesterol    • Esophageal reflux 3/4/2019   • Hypertension      No past surgical history on file.  No family history on file.  Social History     Tobacco Use   • Smoking status: Never Smoker   • Smokeless tobacco: Never Used   Substance Use Topics   • Alcohol use: No     Frequency: Never   • Drug use: No     Medications Prior to Admission   Medication Sig Dispense Refill Last Dose   • ALPRAZolam (XANAX) 0.25 MG tablet Take 0.125 mg by mouth At Night As Needed for Sleep.   Past Month at Unknown time   • CloNIDine (CATAPRES) 0.1 MG tablet Take 0.1 mg by mouth Every 8 (Eight) Hours As Needed for High Blood Pressure (SBP > 170).   Past Month at Unknown time   • colestipol (COLESTID) 1 g tablet Take 1 g by mouth 3 (Three) Times a Day.   3/3/2019 at Unknown time   • esomeprazole (nexIUM) 40 MG capsule Take 40 mg by mouth Every Morning Before Breakfast.   3/3/2019 at Unknown  time   • gabapentin (NEURONTIN) 100 MG capsule Take 100 mg by mouth every night at bedtime.   3/3/2019 at Unknown time   • meclizine (ANTIVERT) 12.5 MG tablet Take 12.5 mg by mouth 3 (Three) Times a Day As Needed for dizziness.   Past Week at Unknown time   • metoprolol tartrate (LOPRESSOR) 100 MG tablet Take 50 mg by mouth 2 (Two) Times a Day.   3/3/2019 at Unknown time   • Mirabegron ER (MYRBETRIQ) 25 MG tablet sustained-release 24 hour 24 hr tablet Take 25 mg by mouth Daily.   3/3/2019 at Unknown time   • Multiple Vitamins-Minerals (MULTIVITAMIN ADULT PO) Take 1 tablet by mouth Daily.   3/3/2019 at Unknown time   • raloxifene (EVISTA) 60 MG tablet Take 60 mg by mouth Daily.   3/3/2019 at Unknown time   • ranolazine (RANEXA) 500 MG 12 hr tablet Take 500 mg by mouth 2 (Two) Times a Day.   3/3/2019 at Unknown time   • triamterene-hydrochlorothiazide (DYAZIDE) 37.5-25 MG per capsule Take 1 capsule by mouth Every Other Day.   3/3/2019 at Unknown time   • warfarin (COUMADIN) 3 MG tablet Take 3 mg by mouth Take As Directed. Sunday, Monday, Wednesday, Friday, and Saturday.   3/3/2019 at Unknown time   • warfarin (COUMADIN) 3 MG tablet Take 4.5 mg by mouth 2 (Two) Times a Week. Tuesday and Thursday.   2/28/2019 at Unknown time     Allergies:  Ciprofloxacin; Darvon [propoxyphene]; and Bactrim [sulfamethoxazole-trimethoprim]    Objective     Vital Signs  Temp:  [97.2 °F (36.2 °C)-97.3 °F (36.3 °C)] 97.2 °F (36.2 °C)  Heart Rate:  [74-81] 74  Resp:  [18] 18  BP: (132-186)/(59-85) 132/59    Physical Exam:    General Appearance: alert, appears stated age and cooperative  Head: normocephalic, without obvious abnormality  Eyes: lids and lashes normal, conjunctivae and sclerae normal and no icterus  Nose: nares normal and septum midline  Neck: supple and trachea midline  Lungs: clear to auscultation and respirations regular  Heart: regular rhythm & normal rate and normal S1, S2  Abdomen: normal bowel sounds, no masses, soft  non-tender and no guarding  Extremities: no edema, no cyanosis and no redness  Skin: turgor normal and color normal  Neurologic: Mental Status orientated to person, place, time and situation, Cranial Nerves cranial nerves 2 - 12 grossly intact as examined    Results Review:    I reviewed the patient's new clinical results.    Assessment/Plan       Urinary tract infection due to extended-spectrum beta lactamase (ESBL) producing Escherichia coli    Action tremor    Colon cancer (CMS/HCC)    Subjective tinnitus    Simple chronic anemia    Disease of blood and blood forming organ    Esophageal reflux      Admitted for arrangement of IV antibiotics to treat her current urinary tract infection.  Appreciate assistance from infectious disease and their recommendations.  Awaiting further labs.  Continue with home medications.  Hopefully can make arrangements for home very soon even with potential home IV antibiotics.    I discussed the patients findings and my recommendations with patient and family.     Aaron Lyons MD  03/04/19  5:46 PM    Time: 30 minutes

## 2019-03-05 PROBLEM — N39.0 URINARY TRACT INFECTION: Status: ACTIVE | Noted: 2019-03-05

## 2019-03-05 LAB
ANION GAP SERPL CALCULATED.3IONS-SCNC: 6 MMOL/L (ref 4–13)
BASOPHILS # BLD AUTO: 0.01 10*3/MM3 (ref 0–0.2)
BASOPHILS NFR BLD AUTO: 0.2 % (ref 0–2)
BUN BLD-MCNC: 17 MG/DL (ref 5–21)
BUN/CREAT SERPL: 17.3 (ref 7–25)
CALCIUM SPEC-SCNC: 8.2 MG/DL (ref 8.4–10.4)
CHLORIDE SERPL-SCNC: 106 MMOL/L (ref 98–110)
CO2 SERPL-SCNC: 29 MMOL/L (ref 24–31)
CREAT BLD-MCNC: 0.98 MG/DL (ref 0.5–1.4)
DEPRECATED RDW RBC AUTO: 52.4 FL (ref 40–54)
EOSINOPHIL # BLD AUTO: 0.12 10*3/MM3 (ref 0–0.7)
EOSINOPHIL NFR BLD AUTO: 2.4 % (ref 0–4)
ERYTHROCYTE [DISTWIDTH] IN BLOOD BY AUTOMATED COUNT: 15.8 % (ref 12–15)
GFR SERPL CREATININE-BSD FRML MDRD: 53 ML/MIN/1.73
GLUCOSE BLD-MCNC: 91 MG/DL (ref 70–100)
HCT VFR BLD AUTO: 32.5 % (ref 37–47)
HGB BLD-MCNC: 10.4 G/DL (ref 12–16)
IMM GRANULOCYTES # BLD AUTO: 0.02 10*3/MM3 (ref 0–0.05)
IMM GRANULOCYTES NFR BLD AUTO: 0.4 % (ref 0–5)
INR PPP: 1.22 (ref 0.91–1.09)
LYMPHOCYTES # BLD AUTO: 0.65 10*3/MM3 (ref 0.72–4.86)
LYMPHOCYTES NFR BLD AUTO: 13 % (ref 15–45)
MCH RBC QN AUTO: 29.4 PG (ref 28–32)
MCHC RBC AUTO-ENTMCNC: 32 G/DL (ref 33–36)
MCV RBC AUTO: 91.8 FL (ref 82–98)
MONOCYTES # BLD AUTO: 0.46 10*3/MM3 (ref 0.19–1.3)
MONOCYTES NFR BLD AUTO: 9.2 % (ref 4–12)
NEUTROPHILS # BLD AUTO: 3.75 10*3/MM3 (ref 1.87–8.4)
NEUTROPHILS NFR BLD AUTO: 74.8 % (ref 39–78)
NRBC BLD AUTO-RTO: 0 /100 WBC (ref 0–0)
PLATELET # BLD AUTO: 488 10*3/MM3 (ref 130–400)
PMV BLD AUTO: 10.1 FL (ref 6–12)
POTASSIUM BLD-SCNC: 3.7 MMOL/L (ref 3.5–5.3)
PROTHROMBIN TIME: 15.8 SECONDS (ref 11.9–14.6)
RBC # BLD AUTO: 3.54 10*6/MM3 (ref 4.2–5.4)
SODIUM BLD-SCNC: 141 MMOL/L (ref 135–145)
WBC NRBC COR # BLD: 5.01 10*3/MM3 (ref 4.8–10.8)

## 2019-03-05 PROCEDURE — G0378 HOSPITAL OBSERVATION PER HR: HCPCS

## 2019-03-05 PROCEDURE — 85610 PROTHROMBIN TIME: CPT | Performed by: FAMILY MEDICINE

## 2019-03-05 PROCEDURE — 96366 THER/PROPH/DIAG IV INF ADDON: CPT

## 2019-03-05 PROCEDURE — 85025 COMPLETE CBC W/AUTO DIFF WBC: CPT | Performed by: FAMILY MEDICINE

## 2019-03-05 PROCEDURE — 25010000002 MEROPENEM PER 100 MG: Performed by: FAMILY MEDICINE

## 2019-03-05 PROCEDURE — 80048 BASIC METABOLIC PNL TOTAL CA: CPT | Performed by: FAMILY MEDICINE

## 2019-03-05 RX ADMIN — METOPROLOL TARTRATE 50 MG: 50 TABLET ORAL at 08:26

## 2019-03-05 RX ADMIN — WARFARIN SODIUM 4.5 MG: 2.5 TABLET ORAL at 18:53

## 2019-03-05 RX ADMIN — RANOLAZINE 500 MG: 500 TABLET, FILM COATED, EXTENDED RELEASE ORAL at 08:27

## 2019-03-05 RX ADMIN — RALOXIFENE HYDROCHLORIDE 60 MG: 60 TABLET, FILM COATED ORAL at 18:53

## 2019-03-05 RX ADMIN — SODIUM CHLORIDE, PRESERVATIVE FREE 3 ML: 5 INJECTION INTRAVENOUS at 22:02

## 2019-03-05 RX ADMIN — TRIAMTERENE AND HYDROCHLOROTHIAZIDE 1 TABLET: 37.5; 25 TABLET ORAL at 08:28

## 2019-03-05 RX ADMIN — ACETAMINOPHEN 650 MG: 325 TABLET, FILM COATED ORAL at 16:30

## 2019-03-05 RX ADMIN — MEROPENEM 1 G: 1 INJECTION, POWDER, FOR SOLUTION INTRAVENOUS at 22:01

## 2019-03-05 RX ADMIN — RANOLAZINE 500 MG: 500 TABLET, FILM COATED, EXTENDED RELEASE ORAL at 22:01

## 2019-03-05 RX ADMIN — METOPROLOL TARTRATE 50 MG: 50 TABLET ORAL at 22:01

## 2019-03-05 RX ADMIN — GABAPENTIN 100 MG: 100 CAPSULE ORAL at 22:01

## 2019-03-05 RX ADMIN — MEROPENEM 1 G: 1 INJECTION, POWDER, FOR SOLUTION INTRAVENOUS at 09:54

## 2019-03-05 RX ADMIN — SODIUM CHLORIDE, PRESERVATIVE FREE 10 ML: 5 INJECTION INTRAVENOUS at 09:53

## 2019-03-05 RX ADMIN — PANTOPRAZOLE SODIUM 40 MG: 40 TABLET, DELAYED RELEASE ORAL at 06:12

## 2019-03-05 NOTE — PLAN OF CARE
Problem: Patient Care Overview  Goal: Plan of Care Review  Outcome: Ongoing (interventions implemented as appropriate)   03/05/19 0110   Coping/Psychosocial   Plan of Care Reviewed With patient   Plan of Care Review   Progress no change   OTHER   Outcome Summary Pt denies pain so far this shift; straight cath x1 for UA with C&S, results posted; CT of abd and pelvis completed and results posted; up to BR with rolling walker; IID x1; IV abx as ordered; will monitor.     Goal: Individualization and Mutuality  Outcome: Ongoing (interventions implemented as appropriate)    Goal: Discharge Needs Assessment  Outcome: Ongoing (interventions implemented as appropriate)    Goal: Interprofessional Rounds/Family Conf  Outcome: Ongoing (interventions implemented as appropriate)      Problem: Fall Risk (Adult)  Goal: Absence of Fall  Outcome: Ongoing (interventions implemented as appropriate)      Problem: Urinary Tract Infection (Adult)  Goal: Signs and Symptoms of Listed Potential Problems Will be Absent, Minimized or Managed (Urinary Tract Infection)  Outcome: Ongoing (interventions implemented as appropriate)

## 2019-03-05 NOTE — PLAN OF CARE
Problem: Patient Care Overview  Goal: Plan of Care Review  Outcome: Ongoing (interventions implemented as appropriate)   03/05/19 1316   Coping/Psychosocial   Plan of Care Reviewed With patient;family   Plan of Care Review   Progress improving   OTHER   Outcome Summary pt. denies pain, cont. IV ABX, awaiting final urine culture     Goal: Individualization and Mutuality  Outcome: Ongoing (interventions implemented as appropriate)   03/05/19 1316   Individualization   Patient Specific Preferences likes to keep door shut when daughter is visiting   Patient Specific Goals (Include Timeframe) relief of UTI symptoms   Patient Specific Interventions cont. IV ABX   Mutuality/Individual Preferences   What Anxieties, Fears, Concerns, or Questions Do You Have About Your Care? denies   What Information Would Help Us Give You More Personalized Care? pt. hard of hearing, hearing aid to left ear   How Would You and/or Your Support Person Like to Participate in Your Care? keep pt and family updated    Mutuality/Individual Preferences   How to Address Anxieties/Fears none     Goal: Discharge Needs Assessment  Outcome: Ongoing (interventions implemented as appropriate)      Problem: Fall Risk (Adult)  Goal: Absence of Fall  Outcome: Ongoing (interventions implemented as appropriate)      Problem: Urinary Tract Infection (Adult)  Goal: Signs and Symptoms of Listed Potential Problems Will be Absent, Minimized or Managed (Urinary Tract Infection)  Outcome: Ongoing (interventions implemented as appropriate)   03/05/19 1316   Goal/Outcome Evaluation   Problems Assessed (Urinary Tract Infection) all   Problems Present (UTI) none      03/05/19 1316   Goal/Outcome Evaluation   Problems Assessed (Urinary Tract Infection) all   Problems Present (UTI) other (see comments)  (UTI symptoms)

## 2019-03-05 NOTE — PROGRESS NOTES
Discharge Planning Assessment  [facility]     Patient Name: Sana Laboy  MRN: [id number]  Today's Date: 3/5/2019    Admit Date: 3/4/2019    Discharge Needs Assessment     Row Name 03/05/19 1122       Living Environment    Lives With  alone  (Pended)     Current Living Arrangements  home/apartment/condo  (Pended)     Primary Care Provided by  child(kristofer)  (Pended)     Provides Primary Care For  no one  (Pended)     Family Caregiver if Needed  child(kristofer), adult  (Pended)     Quality of Family Relationships  helpful;supportive  (Pended)        Resource/Environmental Concerns    Resource/Environmental Concerns  none  (Pended)        Transition Planning    Patient/Family Anticipates Transition to  home  (Pended)     Transportation Anticipated  family or friend will provide  (Pended)        Discharge Needs Assessment    Equipment Currently Used at Home  walker, rolling;shower chair  (Pended)     Equipment Needed After Discharge  none  (Pended)     Discharge Coordination/Progress  SW spoke to patient regarding discharge planning. Eladia has a PCP & RX coverage. Eladia uses DME : walker;rolling, and a showerchair. Eladia stated that she has family that lives within 2 minutes of her household. Eladia's children take primary care of her. Eladia had no other needs at this time. SW will follow up with patient, if any needs become available.   (Pended)         Discharge Plan    No documentation.       Destination      No service coordination in this encounter.      Durable Medical Equipment      No service coordination in this encounter.      Dialysis/Infusion      No service coordination in this encounter.      Home Medical Care      No service coordination in this encounter.      Community Resources      No service coordination in this encounter.          Demographic Summary    No documentation.       Functional Status    No documentation.       Psychosocial    No documentation.       Abuse/Neglect    No  documentation.       Legal    No documentation.       Substance Abuse    No documentation.       Patient Forms    No documentation.           Sharon Meier

## 2019-03-05 NOTE — PROGRESS NOTES
Family Medicine Progress Note    Patient:  Sana Laboy  YOB: 1924    MRN: [id number]     Acct: [id number]     Admit date: 3/4/2019    Patient Seen, Chart, Consults notes, Labs, Radiology studies reviewed.    Subjective: Day 1 of stay with urinary tract infection from ESBL producing E. coli species and most recent (in last 24 hours) has had no new problems.  Slept well.  Denies any pain.  Blood pressures been stable.    Past, Family, Social History unchanged from admission.    Diet: Diet Regular    Medications:  Scheduled Meds:  colestipol 1 g Oral Daily With Breakfast, Lunch & Dinner   gabapentin 100 mg Oral Nightly   meropenem 1 g Intravenous Q12H   metoprolol tartrate 50 mg Oral BID   pantoprazole 40 mg Oral Q AM   raloxifene 60 mg Oral Daily   ranolazine 500 mg Oral BID   sodium chloride 3 mL Intravenous Q12H   triamterene-hydrochlorothiazide 1 tablet Oral Every Other Day   warfarin 3 mg Oral Once per day on Sun Mon Wed Fri Sat   warfarin 4.5 mg Oral Once per day on Tue Thu     Continuous Infusions:   PRN Meds:•  acetaminophen  •  CloNIDine  •  HYDROcodone-acetaminophen  •  meclizine  •  sodium chloride    Objective:    Lab Results (last 24 hours)     Procedure Component Value Units Date/Time    Urinalysis, Microscopic Only - Urine, Catheter In/Out [[id number]]  (Abnormal) Collected:  03/04/19 2025    Specimen:  Urine, Catheter In/Out Updated:  03/04/19 2059     RBC, UA 13-20 /HPF      WBC, UA Too Numerous to Count /HPF      Bacteria, UA 1+ /HPF      Squamous Epithelial Cells, UA 3-6 /HPF      Hyaline Casts, UA None Seen /LPF      Methodology Manual Light Microscopy    Urinalysis With Culture If Indicated - Urine, Catheter In/Out [[id number]]  (Abnormal) Collected:  03/04/19 2025    Specimen:  Urine, Catheter In/Out Updated:  03/04/19 2059     Color, UA Yellow     Appearance, UA Turbid     pH, UA <=5.0     Specific Gravity, UA 1.013     Glucose, UA Negative     Ketones, UA Negative      Bilirubin, UA Negative     Blood, UA Moderate (2+)     Protein, UA 30 mg/dL (1+)     Leuk Esterase, UA Large (3+)     Nitrite, UA Positive     Urobilinogen, UA 0.2 E.U./dL    Urine Culture - Urine, Urine, Catheter In/Out [[id number]] Collected:  03/04/19 2025    Specimen:  Urine, Catheter In/Out Updated:  03/04/19 2059    Basic Metabolic Panel [[id number]]  (Abnormal) Collected:  03/04/19 1254    Specimen:  Blood Updated:  03/04/19 1335     Glucose 89 mg/dL      BUN 21 mg/dL      Creatinine 1.08 mg/dL      Sodium 140 mmol/L      Potassium 4.1 mmol/L      Chloride 107 mmol/L      CO2 29.0 mmol/L      Calcium 8.7 mg/dL      eGFR Non African Amer 47 mL/min/1.73      BUN/Creatinine Ratio 19.4     Anion Gap 4.0 mmol/L     Narrative:       The MDRD GFR formula is only valid for adults with stable renal function between ages 18 and 70.    CBC Auto Differential [[id number]]  (Abnormal) Collected:  03/04/19 1254    Specimen:  Blood Updated:  03/04/19 1326     WBC 5.29 10*3/mm3      RBC 3.86 10*6/mm3      Hemoglobin 11.0 g/dL      Hematocrit 34.6 %      MCV 89.6 fL      MCH 28.5 pg      MCHC 31.8 g/dL      RDW 15.8 %      RDW-SD 51.7 fl      MPV 9.9 fL      Platelets 567 10*3/mm3      Neutrophil % 76.3 %      Lymphocyte % 13.2 %      Monocyte % 7.6 %      Eosinophil % 2.1 %      Basophil % 0.4 %      Immature Grans % 0.4 %      Neutrophils, Absolute 4.04 10*3/mm3      Lymphocytes, Absolute 0.70 10*3/mm3      Monocytes, Absolute 0.40 10*3/mm3      Eosinophils, Absolute 0.11 10*3/mm3      Basophils, Absolute 0.02 10*3/mm3      Immature Grans, Absolute 0.02 10*3/mm3      nRBC 0.0 /100 WBC            Imaging Results (last 72 hours)     Procedure Component Value Units Date/Time    CT Abdomen Pelvis Stone Protocol [[id number]] Collected:  03/04/19 2205     Updated:  03/04/19 2216    Narrative:       EXAMINATION: CT ABDOMEN PELVIS STONE PROTOCOL- 3/4/2019 10:05 PM CST     HISTORY: Recurrent UTI. Right flank pain.     DOSE: 271 mGycm  (Automatic exposure control technique was implemented in  an effort to keep the radiation dose as low as possible without  compromising image quality)     REPORT: Spiral CT of the abdomen and pelvis was performed without  contrast from the lung bases through the pubic symphysis. Reconstructed  coronal and sagittal images are also reviewed.     Comparison: CT abdomen and pelvis without contrast 3/29/2016.     Review of lung windows demonstrates mild bibasilar atelectasis,  scattered calcified granulomas are present in the right base. No  effusion is identified. Coronary artery calcifications are noted. There  is stable benign-appearing calcification at the dome of the liver and  measures about 1 cm. There is a small low-attenuation lesion in the  inferior right liver lobe that measures 9 mm, probably a small cyst.  This is not well seen on the previous CT. The liver is otherwise  homogeneous. The spleen appears unremarkable. There is incomplete  distention of the stomach. The pancreas and left adrenal gland appear  normal. There is a low-attenuation nodule in right adrenal gland that  measures 2.7 cm, this has an attenuation of 21 Hounsfield units. This  previously measured 3.0 cm, essentially unchanged. An adenoma is most  likely. No nephrolithiasis or hydronephrosis is identified. There is  mild atrophy of both kidneys. The ureters are decompressed. There is  mild bilateral thickening in the bladder is not well distended. This  could be due to cystitis or underdistention artifact. No free fluid or  free air is identified. There numerous phleboliths in the pelvis. Bowel  loops are normal in caliber, moderate stool volume is present. There are  occasional diverticula within the descending colon. No evidence of  diverticulitis. There is a small probable submucosal lipoma in the wall  of the distal stomach is unchanged and measures less than 1 cm. An IVC  filter is noted, as before. There moderate atherosclerotic  "changes of  the abdominal aorta and its branches. Review of bone windows shows  advanced degenerative disc disease L4-5 and L5-S1, including vacuum  discs at both levels. There is diffuse osteopenia.       Impression:       1. Mild bladder wall thickening which may be related to cystitis or  incomplete bladder distention. No evidence of hydronephrosis or kidney  stones. The ureters are decompressed.  2. New small 9 mm low-attenuation lesion in the inferomedial right  hepatic lobe, possibly a cyst. This may not been seen on the previous  study due to the phase of contrast. There is a stable benign-appearing  coarse calcification in the liver dome.  3. Stable low-attenuation adenoma in the right adrenal gland. This  measures up to 2.7 cm.  4. No free fluid or free air.  5. Stable IVC filter.  6. Advanced degenerative changes of the lower lumbar spine.        This report was finalized on 03/04/2019 22:13 by Dr. Santiago Roth MD.           Physical Exam:    Vitals: /79 (BP Location: Right arm, Patient Position: Lying)   Pulse 72   Temp 98.8 °F (37.1 °C) (Oral)   Resp 16   Ht 160 cm (63\")   Wt 61.1 kg (134 lb 9.6 oz)   SpO2 98%   BMI 23.84 kg/m²   24 hour intake/output:    Intake/Output Summary (Last 24 hours) at 3/5/2019 0709  Last data filed at 3/5/2019 0623  Gross per 24 hour   Intake 100 ml   Output 1190 ml   Net -1090 ml     Last 3 weights:  Wt Readings from Last 3 Encounters:   03/04/19 61.1 kg (134 lb 9.6 oz)       General Appearance alert, appears stated age and cooperative  Head normocephalic, without obvious abnormality  Eyes lids and lashes normal, conjunctivae and sclerae normal and no icterus  Neck supple and trachea midline  Lungs clear to auscultation, respirations regular, respirations even and respirations unlabored  Heart regular rhythm & normal rate and normal S1, S2  Abdomen normal bowel sounds, soft non-tender and no guarding  Extremities no edema, no cyanosis and no redness  Skin " turgor normal and color normal  Neurologic Mental Status orientated to person, place, time and situation        Assessment:      Urinary tract infection due to extended-spectrum beta lactamase (ESBL) producing Escherichia coli    Action tremor    Colon cancer (CMS/HCC)    Subjective tinnitus    Simple chronic anemia    Disease of blood and blood forming organ    Esophageal reflux          Plan:  CT appears okay with no obstructing lesions of concern in the urinary tract.  Urinalysis confirms urinary tract infection and will await identification and sensitivities.  Continue with current antibiotic treatment as per infectious disease.  Plans are in order depending on the results of laboratory testing for possible completion of antibiotics as an outpatient either through outpatient infusion or through home health.  Possibly home as early as tomorrow if results and pre-certification can be completed.      Electronically signed by Aaron Lyons MD on 3/5/2019 at 7:09 AM

## 2019-03-05 NOTE — PROGRESS NOTES
Infectious Diseases Progress Note    Patient:  Sana Laboy  YOB: 1924  MRN: [id number]   Admit date: 3/4/2019   Admitting Physician: Aaron Lyons MD  Primary Care Physician: Aaron Lyons MD    Chief Complaint/Interval History: She is without fever or chills.  No suprapubic or flank pain.  Still with some urinary frequency.  I do not think she had quite as much dysuria.  She described some slight difficulty focusing.  I did not get any other neurological symptoms.  Specifically no paresthesia, anesthesia, weakness, or speech difficulty.  She does follow with Dr. Watkins as an outpatient.  She last saw him a few weeks ago.  She has had some tightness over the forehead area.  She did not describe any parietal or temporal area headache.  She has been without fever.  Discussed CT results with patient and her daughter.    Intake/Output Summary (Last 24 hours) at 3/5/2019 1719  Last data filed at 3/5/2019 1616  Gross per 24 hour   Intake 820 ml   Output 1890 ml   Net -1070 ml     Allergies:   Allergies   Allergen Reactions   • Ciprofloxacin Diarrhea   • Darvon [Propoxyphene] Nausea And Vomiting   • Bactrim [Sulfamethoxazole-Trimethoprim] Rash     Current Scheduled Medications:     colestipol 1 g Oral Daily With Breakfast, Lunch & Dinner   gabapentin 100 mg Oral Nightly   meropenem 1 g Intravenous Q12H   metoprolol tartrate 50 mg Oral BID   pantoprazole 40 mg Oral Q AM   raloxifene 60 mg Oral Daily   ranolazine 500 mg Oral BID   sodium chloride 3 mL Intravenous Q12H   triamterene-hydrochlorothiazide 1 tablet Oral Every Other Day   warfarin 3 mg Oral Once per day on Sun Mon Wed Fri Sat   warfarin 4.5 mg Oral Once per day on Tue Thu     Current PRN Medications:  •  acetaminophen  •  CloNIDine  •  HYDROcodone-acetaminophen  •  meclizine  •  sodium chloride    Review of Systems no nausea or diarrhea.    Vital Signs:  /82 (BP Location: Right arm, Patient Position: Lying)   Pulse 82   " Temp 98.6 °F (37 °C) (Oral)   Resp 16   Ht 160 cm (63\")   Wt 61.1 kg (134 lb 9.6 oz)   SpO2 98%   BMI 23.84 kg/m²     Physical Exam  Vital signs reviewed.    No suprapubic or flank tenderness.  No tenderness over temporal/parietal areas.  Abdomen soft and nontender  Lungs without crackles  Visual fields intact confrontation all quadrants both eyes.  Cranial nerves II through XII intact.   strength symmetrical.  Line/IV site: No erythema, warmth, induration, or tenderness.    Lab Results:  CBC: Results from last 7 days   Lab Units 03/05/19  0720 03/04/19  1254   WBC 10*3/mm3 5.01 5.29   HEMOGLOBIN g/dL 10.4* 11.0*   HEMATOCRIT % 32.5* 34.6*   PLATELETS 10*3/mm3 488* 567*     BMP:  Results from last 7 days   Lab Units 03/05/19  0720 03/04/19  1254   SODIUM mmol/L 141 140   POTASSIUM mmol/L 3.7 4.1   CHLORIDE mmol/L 106 107   CO2 mmol/L 29.0 29.0   BUN mg/dL 17 21   CREATININE mg/dL 0.98 1.08   GLUCOSE mg/dL 91 89   CALCIUM mg/dL 8.2* 8.7     Urinalysis done yesterday revealed too numerous to count white blood cells, 1+ bacteria and 13-20 red blood cells.    Culture Results:   Urine Culture   Date Value Ref Range Status   03/04/2019 No growth at 24 hours  Preliminary   (This urine culture was done after she had received a dose of meropenem)    Radiology:   IMPRESSION:  1. Mild bladder wall thickening which may be related to cystitis or  incomplete bladder distention. No evidence of hydronephrosis or kidney  stones. The ureters are decompressed.  2. New small 9 mm low-attenuation lesion in the inferomedial right  hepatic lobe, possibly a cyst. This may not been seen on the previous  study due to the phase of contrast. There is a stable benign-appearing  coarse calcification in the liver dome.  3. Stable low-attenuation adenoma in the right adrenal gland. This  measures up to 2.7 cm.  4. No free fluid or free air.  5. Stable IVC filter.  6. Advanced degenerative changes of the lower lumbar spine.  This report " was finalized on 03/04/2019 22:13 by Dr. Santiago Roth MD.    Additional Studies Reviewed: None    Impression:   Suspect cystitis secondary to ESBL positive E. coli.  She complained of a little bit of difficulty focusing her vision.  No other neurological symptoms at present.    Recommendations:   Continue treatment with Merrem  Await urine culture done yesterday-it may be negative based on the fact she had received a dose of Merrem prior to culture  Her culture from [facility] from last week had grown ESBL positive E. coli  If she shows ongoing improvement feel she could complete treatment as an outpatient  For outpatient treatment would suggest ertapenem 1 g IV daily through March 12, 2019  We will discuss with Dr. Peoples  She could likely get a midline catheter tomorrow and either have arrangements for home IV antibiotic treatment with ertapenem or she could come to outpatient area once daily for treatment    Danie Johnson MD

## 2019-03-06 VITALS
HEIGHT: 63 IN | OXYGEN SATURATION: 93 % | DIASTOLIC BLOOD PRESSURE: 69 MMHG | TEMPERATURE: 99.4 F | SYSTOLIC BLOOD PRESSURE: 124 MMHG | BODY MASS INDEX: 23.85 KG/M2 | HEART RATE: 76 BPM | WEIGHT: 134.6 LBS | RESPIRATION RATE: 16 BRPM

## 2019-03-06 LAB
ANION GAP SERPL CALCULATED.3IONS-SCNC: 4 MMOL/L (ref 4–13)
BACTERIA SPEC AEROBE CULT: NORMAL
BASOPHILS # BLD AUTO: 0.02 10*3/MM3 (ref 0–0.2)
BASOPHILS NFR BLD AUTO: 0.4 % (ref 0–2)
BUN BLD-MCNC: 13 MG/DL (ref 5–21)
BUN/CREAT SERPL: 13 (ref 7–25)
CALCIUM SPEC-SCNC: 8.4 MG/DL (ref 8.4–10.4)
CHLORIDE SERPL-SCNC: 104 MMOL/L (ref 98–110)
CO2 SERPL-SCNC: 30 MMOL/L (ref 24–31)
CREAT BLD-MCNC: 1 MG/DL (ref 0.5–1.4)
CRP SERPL-MCNC: 0.74 MG/DL (ref 0–0.99)
DEPRECATED RDW RBC AUTO: 51.3 FL (ref 40–54)
EOSINOPHIL # BLD AUTO: 0.16 10*3/MM3 (ref 0–0.7)
EOSINOPHIL NFR BLD AUTO: 3.1 % (ref 0–4)
ERYTHROCYTE [DISTWIDTH] IN BLOOD BY AUTOMATED COUNT: 15.8 % (ref 12–15)
GFR SERPL CREATININE-BSD FRML MDRD: 52 ML/MIN/1.73
GLUCOSE BLD-MCNC: 95 MG/DL (ref 70–100)
HCT VFR BLD AUTO: 32.3 % (ref 37–47)
HGB BLD-MCNC: 10.3 G/DL (ref 12–16)
IMM GRANULOCYTES # BLD AUTO: 0.02 10*3/MM3 (ref 0–0.05)
IMM GRANULOCYTES NFR BLD AUTO: 0.4 % (ref 0–5)
INR PPP: 1.42 (ref 0.91–1.09)
LYMPHOCYTES # BLD AUTO: 0.46 10*3/MM3 (ref 0.72–4.86)
LYMPHOCYTES NFR BLD AUTO: 9 % (ref 15–45)
MCH RBC QN AUTO: 28.3 PG (ref 28–32)
MCHC RBC AUTO-ENTMCNC: 31.9 G/DL (ref 33–36)
MCV RBC AUTO: 88.7 FL (ref 82–98)
MONOCYTES # BLD AUTO: 0.66 10*3/MM3 (ref 0.19–1.3)
MONOCYTES NFR BLD AUTO: 12.9 % (ref 4–12)
NEUTROPHILS # BLD AUTO: 3.81 10*3/MM3 (ref 1.87–8.4)
NEUTROPHILS NFR BLD AUTO: 74.2 % (ref 39–78)
NRBC BLD AUTO-RTO: 0 /100 WBC (ref 0–0)
PLATELET # BLD AUTO: 462 10*3/MM3 (ref 130–400)
PMV BLD AUTO: 10 FL (ref 6–12)
POTASSIUM BLD-SCNC: 3.5 MMOL/L (ref 3.5–5.3)
PROTHROMBIN TIME: 17.8 SECONDS (ref 11.9–14.6)
RBC # BLD AUTO: 3.64 10*6/MM3 (ref 4.2–5.4)
SODIUM BLD-SCNC: 138 MMOL/L (ref 135–145)
WBC NRBC COR # BLD: 5.13 10*3/MM3 (ref 4.8–10.8)

## 2019-03-06 PROCEDURE — 86140 C-REACTIVE PROTEIN: CPT | Performed by: INTERNAL MEDICINE

## 2019-03-06 PROCEDURE — G0378 HOSPITAL OBSERVATION PER HR: HCPCS

## 2019-03-06 PROCEDURE — 36410 VNPNXR 3YR/> PHY/QHP DX/THER: CPT

## 2019-03-06 PROCEDURE — 25010000002 MEROPENEM PER 100 MG: Performed by: FAMILY MEDICINE

## 2019-03-06 PROCEDURE — 96367 TX/PROPH/DG ADDL SEQ IV INF: CPT

## 2019-03-06 PROCEDURE — 85610 PROTHROMBIN TIME: CPT | Performed by: FAMILY MEDICINE

## 2019-03-06 PROCEDURE — C1751 CATH, INF, PER/CENT/MIDLINE: HCPCS

## 2019-03-06 PROCEDURE — 25010000002 ERTAPENEM PER 500 MG: Performed by: INTERNAL MEDICINE

## 2019-03-06 PROCEDURE — 85025 COMPLETE CBC W/AUTO DIFF WBC: CPT | Performed by: INTERNAL MEDICINE

## 2019-03-06 PROCEDURE — 80048 BASIC METABOLIC PNL TOTAL CA: CPT | Performed by: FAMILY MEDICINE

## 2019-03-06 RX ORDER — ERTAPENEM 1 G/1
INJECTION, POWDER, LYOPHILIZED, FOR SOLUTION INTRAMUSCULAR; INTRAVENOUS
Qty: 6000 MG | Refills: 0
Start: 2019-03-07 | End: 2019-03-12

## 2019-03-06 RX ADMIN — METOPROLOL TARTRATE 50 MG: 50 TABLET ORAL at 08:11

## 2019-03-06 RX ADMIN — MEROPENEM 1 G: 1 INJECTION, POWDER, FOR SOLUTION INTRAVENOUS at 10:22

## 2019-03-06 RX ADMIN — RANOLAZINE 500 MG: 500 TABLET, FILM COATED, EXTENDED RELEASE ORAL at 08:11

## 2019-03-06 RX ADMIN — PANTOPRAZOLE SODIUM 40 MG: 40 TABLET, DELAYED RELEASE ORAL at 08:11

## 2019-03-06 RX ADMIN — SODIUM CHLORIDE 1 G: 900 INJECTION INTRAVENOUS at 15:03

## 2019-03-06 NOTE — PROGRESS NOTES
"Infectious Diseases Progress Note    Patient:  Sana Laboy  YOB: 1924  MRN: [id number]   Admit date: 3/4/2019   Admitting Physician: Aaron Lyons MD  Primary Care Physician: Aaron Lyons MD    Chief Complaint/Interval History: Patient seen on rounds earlier today.  She was released home prior to completion of this note.  She is feeling better.  No visual symptoms today.  No complaints of headache.  Breathing comfortably.  No neck pain or cough.  No nausea or diarrhea.  She had midline catheter placed in left arm today.  Discussed with Dr. Peoples this morning.  She has elected to go home with intravenous antibiotic therapy.  Patient and family feel ready for discharge.    Intake/Output Summary (Last 24 hours) at 3/6/2019 1330  Last data filed at 3/6/2019 1122  Gross per 24 hour   Intake 1200 ml   Output 2250 ml   Net -1050 ml     Allergies:   Allergies   Allergen Reactions   • Ciprofloxacin Diarrhea   • Darvon [Propoxyphene] Nausea And Vomiting   • Bactrim [Sulfamethoxazole-Trimethoprim] Rash     Current Scheduled Medications:     colestipol 1 g Oral Daily With Breakfast, Lunch & Dinner   gabapentin 100 mg Oral Nightly   meropenem 1 g Intravenous Q12H   metoprolol tartrate 50 mg Oral BID   pantoprazole 40 mg Oral Q AM   raloxifene 60 mg Oral Daily   ranolazine 500 mg Oral BID   sodium chloride 3 mL Intravenous Q12H   triamterene-hydrochlorothiazide 1 tablet Oral Every Other Day   warfarin 3 mg Oral Once per day on Sun Mon Wed Fri Sat   warfarin 4.5 mg Oral Once per day on Tue Thu     Current PRN Medications:  •  acetaminophen  •  CloNIDine  •  HYDROcodone-acetaminophen  •  meclizine  •  sodium chloride    Review of Systems see HPI    Vital Signs:  /69 (BP Location: Right arm, Patient Position: Lying)   Pulse 76   Temp 99.4 °F (37.4 °C)   Resp 16   Ht 160 cm (63\")   Wt 61.1 kg (134 lb 9.6 oz)   SpO2 93%   BMI 23.84 kg/m²     Physical Exam  Vital signs " reviewed.  Abdomen is soft and nontender.  No suprapubic tenderness.  No flank tenderness.  Line/IV (left arm) site: No erythema, warmth, induration, or tenderness.    Lab Results:  CBC: Results from last 7 days   Lab Units 03/06/19  0645 03/05/19  0720 03/04/19  1254   WBC 10*3/mm3 5.13 5.01 5.29   HEMOGLOBIN g/dL 10.3* 10.4* 11.0*   HEMATOCRIT % 32.3* 32.5* 34.6*   PLATELETS 10*3/mm3 462* 488* 567*     BMP:  Results from last 7 days   Lab Units 03/06/19  0645 03/05/19  0720 03/04/19  1254   SODIUM mmol/L 138 141 140   POTASSIUM mmol/L 3.5 3.7 4.1   CHLORIDE mmol/L 104 106 107   CO2 mmol/L 30.0 29.0 29.0   BUN mg/dL 13 17 21   CREATININE mg/dL 1.00 0.98 1.08   GLUCOSE mg/dL 95 91 89   CALCIUM mg/dL 8.4 8.2* 8.7     CRP 0.74    Culture Results:   Urine Culture   Date Value Ref Range Status   03/04/2019 No growth at 2 days  Final     Radiology: None  Additional Studies Reviewed: None    Impression:   Suspect cystitis with ESBL positive E. coli.  She is responding to antibiotic treatment.  Her urine culture this admission via in and out cath is no growth, but she had had prior meropenem treatment.    Recommendations:   Okay with me for discharge home  Suggest ertapenem 1 g IV daily through March 12, 2019  Suggest midline removal following completion of her ertapenem treatment  She is going to follow-up with Dr. Peoples  Would be happy to reassess if any recurrent symptoms or problems  She can otherwise follow-up with infectious diseases as needed    Danie Johnson MD

## 2019-03-06 NOTE — CONSULTS
8 cm Midline catheter placed in pt left brachial vein. Pt tolerated procedure well. Line flushes and draws well. Sterile dressing applied.

## 2019-03-06 NOTE — PLAN OF CARE
Problem: Patient Care Overview  Goal: Plan of Care Review  Outcome: Ongoing (interventions implemented as appropriate)   03/06/19 0809   Coping/Psychosocial   Plan of Care Reviewed With patient   Plan of Care Review   Progress no change   OTHER   Outcome Summary No c/o pain. IV abx cont. Awaiting urine cx results. Up with assist to BR. Pt tolerating diet     Goal: Individualization and Mutuality  Outcome: Ongoing (interventions implemented as appropriate)    Goal: Discharge Needs Assessment  Outcome: Ongoing (interventions implemented as appropriate)      Problem: Fall Risk (Adult)  Goal: Absence of Fall  Outcome: Ongoing (interventions implemented as appropriate)      Problem: Urinary Tract Infection (Adult)  Goal: Signs and Symptoms of Listed Potential Problems Will be Absent, Minimized or Managed (Urinary Tract Infection)  Outcome: Ongoing (interventions implemented as appropriate)

## 2019-03-06 NOTE — PROGRESS NOTES
Continued Stay Note  MIGUEL Landers     Patient Name: Sana Laboy  MRN: [id number]  Today's Date: 3/6/2019    Admit Date: 3/4/2019    Discharge Plan     Row Name 03/06/19 1553       Plan    Plan  Home with [facility]    Patient/Family in Agreement with Plan  yes    Final Discharge Disposition Code  06 - home with home health care    Final Note  Pt is getting invanz right now. She will d/c home this evening. [facility] will deliver pt's medication to her home. Confirmed with Rosie with [facility] [phone] that they will see pt tomorrow afternoon.    Row Name 03/06/19 1352       Plan    Plan  Home with [facility]    Patient/Family in Agreement with Plan  yes    Plan Comments  Spoke with Rosie at [facility] [phone] and they will see pt for her first iv abx at home. However, pt will need to have a dose of Invanz before d/c. Informed pt's nurse.     Row Name 03/06/19 3304       Plan    Plan  Home with [facility]    Patient/Family in Agreement with Plan  yes    Plan Comments  IV abx will cost pt $81.40 and pt is okay with this. DIscussed home health options and pt will go with [facility]. Referral sent.         Discharge Codes    No documentation.       Expected Discharge Date and Time     Expected Discharge Date Expected Discharge Time    Mar 6, 2019             PHILIP Milton

## 2019-03-06 NOTE — PROGRESS NOTES
Continued Stay Note   [place]     Patient Name: Sana Laboy  MRN: [id number]  Today's Date: 3/6/2019    Admit Date: 3/4/2019    Discharge Plan     Row Name 03/06/19 1057       Plan    Plan  Home with home health    Patient/Family in Agreement with Plan  yes    Plan Comments  Pt has orders for iv abx. Discussed with pt and her daughter in the room. Pt will do the iv abx at home with the assistance of home health and family. Orders faxed to [facility] at [phone].        Discharge Codes    No documentation.             PHILIP Milton

## 2019-03-06 NOTE — PROGRESS NOTES
Continued Stay Note   [place]     Patient Name: Sana Laboy  MRN: [id number]  Today's Date: 3/6/2019    Admit Date: 3/4/2019    Discharge Plan     Row Name 03/06/19 1352       Plan    Plan  Home with [facility]    Patient/Family in Agreement with Plan  yes    Plan Comments  Spoke with Rosie at [facility] [phone] and they will see pt for her first iv abx at home. However, pt will need to have a dose of Invanz before d/c. Informed pt's nurse.     Row Name 03/06/19 1334       Plan    Plan  Home with [facility]    Patient/Family in Agreement with Plan  yes    Plan Comments  IV abx will cost pt $81.40 and pt is okay with this. DIscussed home health options and pt will go with [facility]. Referral sent.     Row Name 03/06/19 7692       Plan    Plan  Home with home health    Patient/Family in Agreement with Plan  yes    Plan Comments  Pt has orders for iv abx. Discussed with pt and her daughter in the room. Pt will do the iv abx at home with the assistance of home health and family. Orders faxed to [facility] at [phone].        Discharge Codes    No documentation.             PHILIP Milton

## 2019-03-06 NOTE — DISCHARGE SUMMARY
Date of Discharge:  3/6/2019    Discharge Diagnosis: ESBL producing E. 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        Hospital Course  Patient is a 89 y.o. female presented with urinary tract infection.  She was seen in the outpatient office and urine culture performed.  The results showed a multi resistant organism determined to be E. coli ESBL producing.  Discussed options with the patient and her family and it was felt best to admit her for observation and arrangements for antibiotic treatment with the assistance of infectious disease.  She was directly admitted.  She was started on Merrem.  Infectious disease consultation obtained and repeat urine culture performed.  Unfortunately culture performed after she had received a dose of Merrem, and thus far culture shows no growth.  However urinalysis does show significant signs of infection.  Recommendation is for transition to Invanz given its 24-hour dosing cycle so that she can be treated as an outpatient either through the infusion center or possibly through home health.  Social service is involved and will be assisting with that.  A midline IV is currently ordered.  Once that is completed and arrangements are made to be discharged home.      Procedures Performed         Consults:   Consults     Date and Time Order Name Status Description    3/4/2019 1240 Inpatient Infectious Diseases Consult Completed            Pertinent Test Results: microbiology: urine culture: negative    Condition on Discharge: Stable    Vital Signs  Temp:  [98 °F (36.7 °C)-98.8 °F (37.1 °C)] 98.6 °F (37 °C)  Heart Rate:  [74-92] 80  Resp:  [16-18] 18  BP: (140-149)/(72-85) 140/72    Physical Exam:   General Appearance: alert, appears stated age and cooperative  Head: normocephalic, without obvious abnormality  Neck: supple and trachea midline  Lungs: clear to auscultation, respirations regular, respirations even and respirations unlabored  Heart: regular rhythm & normal rate and normal S1, S2  Abdomen: normal bowel sounds, soft non-tender and no guarding  Extremities: moves extremities well, no edema, no cyanosis and no redness  Neurologic: Mental Status orientated to person, place, time and situation    Discharge Disposition      Discharge Medications     Discharge Medications      ASK your doctor about these medications      Instructions Start Date   ALPRAZolam 0.25 MG tablet  Commonly known as:  XANAX   0.125 mg, Oral, Nightly PRN      CloNIDine 0.1 MG tablet  Commonly known as:  CATAPRES   0.1 mg, Oral, Every 8 Hours PRN      colestipol 1 g tablet  Commonly known as:  COLESTID   1 g, Oral, 3 Times Daily      DYAZIDE 37.5-25 MG per capsule  Generic drug:  triamterene-hydrochlorothiazide   1 capsule, Oral, Every Other Day      esomeprazole 40 MG capsule  Commonly known as:  nexIUM   40 mg, Oral, Every Morning Before Breakfast      gabapentin 100 MG capsule  Commonly known as:  NEURONTIN   100 mg, Oral, Every Night at Bedtime      meclizine 12.5 MG tablet  Commonly known as:  ANTIVERT   12.5 mg, Oral, 3 Times Daily PRN      metoprolol tartrate 100 MG tablet  Commonly known as:  LOPRESSOR   50 mg, Oral, 2 Times Daily      MULTIVITAMIN ADULT PO   1 tablet, Oral, Daily      MYRBETRIQ 25 MG tablet sustained-release 24 hour 24 hr tablet  Generic drug:  Mirabegron ER   25 mg, Oral, Daily at 1100      raloxifene 60 MG tablet  Commonly known as:   EVISTA   60 mg, Oral, Daily      ranolazine 500 MG 12 hr tablet  Commonly known as:  RANEXA   500 mg, Oral, 2 Times Daily      warfarin 3 MG tablet  Commonly known as:  COUMADIN   3 mg, Oral, Take As Directed, Sunday, Monday, Wednesday, Friday, and Saturday.      COUMADIN 3 MG tablet  Generic drug:  warfarin   4.5 mg, Oral, 2 Times Weekly, Tuesday and Thursday.             Discharge Diet: regular    Activity at Discharge: ad zelda    Follow-up Appointments  No future appointments.  See me in 1-2 weeks.    Test Results Pending at Discharge   Order Current Status    Basic Metabolic Panel In process    C-reactive Protein In process    Protime-INR In process    Urine Culture - Urine, Urine, Catheter In/Out Preliminary result           Aaron Lyons MD  03/06/19  7:04 AM    Time: Discharge 20 min

## 2019-05-24 LAB
BACTERIA: ABNORMAL /HPF
BILIRUBIN URINE: NEGATIVE
BLOOD, URINE: NEGATIVE
CLARITY: ABNORMAL
COLOR: YELLOW
EPITHELIAL CELLS, UA: 0 /HPF (ref 0–5)
GLUCOSE URINE: NEGATIVE MG/DL
HYALINE CASTS: 1 /HPF (ref 0–8)
INR BLD: 1.62 (ref 0.88–1.18)
KETONES, URINE: NEGATIVE MG/DL
LEUKOCYTE ESTERASE, URINE: ABNORMAL
NITRITE, URINE: NEGATIVE
PH UA: 6 (ref 5–8)
PROTEIN UA: NEGATIVE MG/DL
PROTHROMBIN TIME: 18.5 SEC (ref 12–14.6)
RBC UA: 3 /HPF (ref 0–4)
SPECIFIC GRAVITY UA: 1.01 (ref 1–1.03)
URINE REFLEX TO CULTURE: YES
UROBILINOGEN, URINE: 0.2 E.U./DL
WBC UA: 352 /HPF (ref 0–5)

## 2019-05-26 LAB
ORGANISM: ABNORMAL
URINE CULTURE, ROUTINE: ABNORMAL
URINE CULTURE, ROUTINE: ABNORMAL

## 2019-08-07 DIAGNOSIS — N39.0 URINARY TRACT INFECTION WITHOUT HEMATURIA, SITE UNSPECIFIED: ICD-10-CM

## 2019-08-08 ENCOUNTER — HOSPITAL ENCOUNTER (OUTPATIENT)
Dept: INFUSION THERAPY | Age: 84
Setting detail: INFUSION SERIES
Discharge: HOME | End: 2019-08-08
Payer: MEDICARE

## 2019-08-08 VITALS
RESPIRATION RATE: 18 BRPM | HEIGHT: 63 IN | HEART RATE: 78 BPM | OXYGEN SATURATION: 90 % | WEIGHT: 129.06 LBS | BODY MASS INDEX: 22.87 KG/M2 | DIASTOLIC BLOOD PRESSURE: 83 MMHG | SYSTOLIC BLOOD PRESSURE: 129 MMHG | TEMPERATURE: 97.3 F

## 2019-08-08 DIAGNOSIS — N39.0 URINARY TRACT INFECTION WITHOUT HEMATURIA, SITE UNSPECIFIED: Primary | ICD-10-CM

## 2019-08-08 PROCEDURE — 6360000002 HC RX W HCPCS: Performed by: PHYSICIAN ASSISTANT

## 2019-08-08 PROCEDURE — 96365 THER/PROPH/DIAG IV INF INIT: CPT

## 2019-08-08 RX ADMIN — GENTAMICIN SULFATE 100 MG: 100 INJECTION, SOLUTION INTRAVENOUS at 13:44

## 2019-08-08 NOTE — DISCHARGE INSTR - COC
Continuity of Care Form    Patient Name: Anna العراقي   :  10/17/1924  MRN:  [id number]    Admit date:  2019  Discharge date:  ***    Code Status Order: No Order   Advance Directives:     Admitting Physician:  No admitting provider for patient encounter. PCP: Raudel Ocasio MD    Discharging Nurse: [facility] Unit/Room#: No information available for this encounter. Discharging Unit Phone Number: ***    Emergency Contact:   Extended Emergency Contact Information  Primary Emergency Contact: Shekhar Fletcher [address] Phone: [phone]  Relation: Child  Secondary Emergency Contact: Briankeke  [address] Phone: [phone]  Relation: Child    Past Surgical History:  Past Surgical History:   Procedure Laterality Date    CHOLECYSTECTOMY      COLON SURGERY      partial colectomy for carcinoma of colon    COLONOSCOPY  3/2011        COLONOSCOPY  2/15/12        HYSTERECTOMY         Immunization History: There is no immunization history on file for this patient.     Active Problems:  Patient Active Problem List   Diagnosis Code    Diarrhea R19.7    S/P partial colectomy Z90.49    History of colon cancer Z80.26    Encounter for screening colonoscopy Z12.11    UTI (urinary tract infection) N39.0       Isolation/Infection:   Isolation          No Isolation        Patient Infection Status     Infection Onset Added Last Indicated Last Indicated By Review Planned Expiration Resolved Resolved By    ESBL (Extended Spectrum Beta Lactamase) 19 Urine Culture        2019 Urine Culture: E. Coli ESBL  3/1/2019 Urine Culture: E. Coli ESBL          Nurse Assessment:  Last Vital Signs: /83   Pulse 78   Temp 97.3 °F (36.3 °C) (Temporal)   Resp 18   Ht 5' 3\" (1.6 m)   Wt 129 lb 1 oz (58.5 kg)   SpO2 90%   BMI 22.86 kg/m²     Last documented pain score (0-10 scale):    Last Weight:   Wt Readings from Last 1 Encounters:   19 129 lb 1 oz (58.5 kg)     Mental Status:  {IP PT MENTAL STATUS:}    IV Access:  { MAC IV ACCESS:098122118}    Nursing Mobility/ADLs:  Walking   {CHP DME IDGQ:357375506}  Transfer  {CHP DME HVFL:111076205}  Bathing  {CHP DME DWHB:998250637}  Dressing  {CHP DME TDGV:987055492}  Toileting  {CHP DME GNPX:620669525}  Feeding  {CHP DME KTQL:521522213}  Med Admin  {CHP DME XNGR:847270737}  Med Delivery   { MAC MED Delivery:385856836}    Wound Care Documentation and Therapy:        Elimination:  Continence:   · Bowel: {YES / JM:88419}  · Bladder: {YES / GD:61258}  Urinary Catheter: {Urinary Catheter:953682251}   Colostomy/Ileostomy/Ileal Conduit: {YES / DN:00661}       Date of Last BM: ***  No intake or output data in the 24 hours ending 19 1424  No intake/output data recorded.     Safety Concerns:     [address] [employer] Safety Concerns:029036762}    Impairments/Disabilities:      [employer] Impairments/Disabilities:985827175}    Nutrition Therapy:  Current Nutrition Therapy:   [employer] Diet List:398707875}    Routes of Feeding: {P DME Other Feedings:751904425}  Liquids: {Slp liquid thickness:92389}  Daily Fluid Restriction: {CHP DME Yes amt example:526135868}  Last Modified Barium Swallow with Video (Video Swallowing Test): {Done Not Done IKEP:639404079}    Treatments at the Time of Hospital Discharge:   Respiratory Treatments: ***  Oxygen Therapy:  {Therapy; copd oxygen:72803}  Ventilator:    {[facility] Vent TCHY:102736671}    Rehab Therapies: {THERAPEUTIC INTERVENTION:5813949921}  Weight Bearing Status/Restrictions: [employer] Weight Bearin}  Other Medical Equipment (for information only, NOT a DME order):  {EQUIPMENT:780187082}  Other Treatments: ***    Patient's personal belongings (please select all that are sent with patient):  {P DME Belongings:084445439}    RN SIGNATURE:  {Esignature:817471196}    CASE MANAGEMENT/SOCIAL WORK SECTION    Inpatient Status Date: ***    Readmission Risk Assessment Score:  Readmission Risk              Risk of Unplanned Readmission:        0           Discharging to Facility/ Agency   · Name:   · Address:  · Phone:  · Fax:    Dialysis Facility (if applicable)   · Name:  · Address:  · Dialysis Schedule:  · Phone:  · Fax:    / signature: {Esignature:661448653}    PHYSICIAN SECTION    Prognosis: {Prognosis:9277343937}    Condition at Discharge: [facility] Patient Condition:005010020}    Rehab Potential (if transferring to Rehab): {Prognosis:4962276220}    Recommended Labs or Other Treatments After Discharge: ***    Physician Certification: I certify the above information and transfer of [address]  is necessary for the continuing treatment of the diagnosis listed and that she requires {Admit to Appropriate Level of Care:92915} for {GREATER/LESS:015003100} 30 days.      Update Admission H&P: [place] [place] [place] [facility] [place]    PHYSICIAN SIGNATURE:  {Esignature:985419201}

## 2019-08-22 ENCOUNTER — HOSPITAL ENCOUNTER (OUTPATIENT)
Dept: INFUSION THERAPY | Age: 84
Setting detail: INFUSION SERIES
Discharge: HOME | End: 2019-08-22
Payer: MEDICARE

## 2019-08-22 VITALS
HEART RATE: 78 BPM | SYSTOLIC BLOOD PRESSURE: 138 MMHG | DIASTOLIC BLOOD PRESSURE: 79 MMHG | RESPIRATION RATE: 20 BRPM | TEMPERATURE: 97.4 F

## 2019-08-22 DIAGNOSIS — N39.0 URINARY TRACT INFECTION WITHOUT HEMATURIA, SITE UNSPECIFIED: Primary | ICD-10-CM

## 2019-08-22 PROCEDURE — 6360000002 HC RX W HCPCS: Performed by: FAMILY MEDICINE

## 2019-08-22 PROCEDURE — 96365 THER/PROPH/DIAG IV INF INIT: CPT

## 2019-08-22 RX ADMIN — GENTAMICIN SULFATE 100 MG: 100 INJECTION, SOLUTION INTRAVENOUS at 13:45

## 2019-09-04 ENCOUNTER — HOSPITAL ENCOUNTER (OUTPATIENT)
Facility: HOSPITAL | Age: 84
Setting detail: OBSERVATION
Discharge: HOME HEALTH | End: 2019-09-09
Attending: FAMILY MEDICINE | Admitting: FAMILY MEDICINE

## 2019-09-04 DIAGNOSIS — A49.9 ESBL (EXTENDED SPECTRUM BETA-LACTAMASE) PRODUCING BACTERIA INFECTION: Primary | ICD-10-CM

## 2019-09-04 DIAGNOSIS — Z16.12 ESBL (EXTENDED SPECTRUM BETA-LACTAMASE) PRODUCING BACTERIA INFECTION: Primary | ICD-10-CM

## 2019-09-04 LAB
ANION GAP SERPL CALCULATED.3IONS-SCNC: 7 MMOL/L (ref 4–13)
BACTERIA UR QL AUTO: ABNORMAL /HPF
BASOPHILS # BLD AUTO: 0.02 10*3/MM3 (ref 0–0.2)
BASOPHILS NFR BLD AUTO: 0.3 % (ref 0–1.5)
BILIRUB UR QL STRIP: NEGATIVE
BUN BLD-MCNC: 19 MG/DL (ref 5–21)
BUN/CREAT SERPL: 14.5 (ref 7–25)
CALCIUM SPEC-SCNC: 8.9 MG/DL (ref 8.4–10.4)
CHLORIDE SERPL-SCNC: 106 MMOL/L (ref 98–110)
CLARITY UR: CLEAR
CO2 SERPL-SCNC: 27 MMOL/L (ref 24–31)
COLOR UR: YELLOW
CREAT BLD-MCNC: 1.31 MG/DL (ref 0.5–1.4)
DEPRECATED RDW RBC AUTO: 48.5 FL (ref 37–54)
EOSINOPHIL # BLD AUTO: 0.19 10*3/MM3 (ref 0–0.4)
EOSINOPHIL NFR BLD AUTO: 3.2 % (ref 0.3–6.2)
ERYTHROCYTE [DISTWIDTH] IN BLOOD BY AUTOMATED COUNT: 14.6 % (ref 12.3–15.4)
GFR SERPL CREATININE-BSD FRML MDRD: 38 ML/MIN/1.73
GLUCOSE BLD-MCNC: 97 MG/DL (ref 70–100)
GLUCOSE UR STRIP-MCNC: NEGATIVE MG/DL
HCT VFR BLD AUTO: 35.8 % (ref 34–46.6)
HGB BLD-MCNC: 11.6 G/DL (ref 12–15.9)
HGB UR QL STRIP.AUTO: NEGATIVE
HYALINE CASTS UR QL AUTO: ABNORMAL /LPF
IMM GRANULOCYTES # BLD AUTO: 0.03 10*3/MM3 (ref 0–0.05)
IMM GRANULOCYTES NFR BLD AUTO: 0.5 % (ref 0–0.5)
INR PPP: 1.28 (ref 0.91–1.09)
KETONES UR QL STRIP: NEGATIVE
LEUKOCYTE ESTERASE UR QL STRIP.AUTO: ABNORMAL
LYMPHOCYTES # BLD AUTO: 0.72 10*3/MM3 (ref 0.7–3.1)
LYMPHOCYTES NFR BLD AUTO: 12 % (ref 19.6–45.3)
MCH RBC QN AUTO: 29.6 PG (ref 26.6–33)
MCHC RBC AUTO-ENTMCNC: 32.4 G/DL (ref 31.5–35.7)
MCV RBC AUTO: 91.3 FL (ref 79–97)
MONOCYTES # BLD AUTO: 0.44 10*3/MM3 (ref 0.1–0.9)
MONOCYTES NFR BLD AUTO: 7.4 % (ref 5–12)
NEUTROPHILS # BLD AUTO: 4.58 10*3/MM3 (ref 1.7–7)
NEUTROPHILS NFR BLD AUTO: 76.6 % (ref 42.7–76)
NITRITE UR QL STRIP: NEGATIVE
NRBC BLD AUTO-RTO: 0 /100 WBC (ref 0–0.2)
PH UR STRIP.AUTO: <=5 [PH] (ref 5–8)
PLATELET # BLD AUTO: 590 10*3/MM3 (ref 140–450)
PMV BLD AUTO: 9.9 FL (ref 6–12)
POTASSIUM BLD-SCNC: 4 MMOL/L (ref 3.5–5.3)
PROT UR QL STRIP: NEGATIVE
PROTHROMBIN TIME: 16.4 SECONDS (ref 11.9–14.6)
RBC # BLD AUTO: 3.92 10*6/MM3 (ref 3.77–5.28)
RBC # UR: ABNORMAL /HPF
REF LAB TEST METHOD: ABNORMAL
SODIUM BLD-SCNC: 140 MMOL/L (ref 135–145)
SP GR UR STRIP: 1.01 (ref 1–1.03)
SQUAMOUS #/AREA URNS HPF: ABNORMAL /HPF
UROBILINOGEN UR QL STRIP: ABNORMAL
WBC NRBC COR # BLD: 5.98 10*3/MM3 (ref 3.4–10.8)
WBC UR QL AUTO: ABNORMAL /HPF

## 2019-09-04 PROCEDURE — C1751 CATH, INF, PER/CENT/MIDLINE: HCPCS

## 2019-09-04 PROCEDURE — G0378 HOSPITAL OBSERVATION PER HR: HCPCS

## 2019-09-04 PROCEDURE — 87086 URINE CULTURE/COLONY COUNT: CPT | Performed by: FAMILY MEDICINE

## 2019-09-04 PROCEDURE — 94799 UNLISTED PULMONARY SVC/PX: CPT

## 2019-09-04 PROCEDURE — 96365 THER/PROPH/DIAG IV INF INIT: CPT

## 2019-09-04 PROCEDURE — 81001 URINALYSIS AUTO W/SCOPE: CPT | Performed by: FAMILY MEDICINE

## 2019-09-04 PROCEDURE — 85025 COMPLETE CBC W/AUTO DIFF WBC: CPT | Performed by: FAMILY MEDICINE

## 2019-09-04 PROCEDURE — 36410 VNPNXR 3YR/> PHY/QHP DX/THER: CPT

## 2019-09-04 PROCEDURE — 96366 THER/PROPH/DIAG IV INF ADDON: CPT

## 2019-09-04 PROCEDURE — 25010000002 MEROPENEM PER 100 MG: Performed by: FAMILY MEDICINE

## 2019-09-04 PROCEDURE — 87088 URINE BACTERIA CULTURE: CPT | Performed by: FAMILY MEDICINE

## 2019-09-04 PROCEDURE — 87186 SC STD MICRODIL/AGAR DIL: CPT | Performed by: FAMILY MEDICINE

## 2019-09-04 PROCEDURE — 80048 BASIC METABOLIC PNL TOTAL CA: CPT | Performed by: FAMILY MEDICINE

## 2019-09-04 PROCEDURE — 85610 PROTHROMBIN TIME: CPT | Performed by: FAMILY MEDICINE

## 2019-09-04 PROCEDURE — 94760 N-INVAS EAR/PLS OXIMETRY 1: CPT

## 2019-09-04 RX ORDER — OXYBUTYNIN CHLORIDE 5 MG/1
5 TABLET, EXTENDED RELEASE ORAL DAILY
Status: DISCONTINUED | OUTPATIENT
Start: 2019-09-04 | End: 2019-09-09 | Stop reason: HOSPADM

## 2019-09-04 RX ORDER — ACETAMINOPHEN 325 MG/1
650 TABLET ORAL EVERY 4 HOURS PRN
Status: DISCONTINUED | OUTPATIENT
Start: 2019-09-04 | End: 2019-09-09 | Stop reason: HOSPADM

## 2019-09-04 RX ORDER — METOPROLOL TARTRATE 50 MG/1
50 TABLET, FILM COATED ORAL 2 TIMES DAILY
Status: DISCONTINUED | OUTPATIENT
Start: 2019-09-04 | End: 2019-09-09 | Stop reason: HOSPADM

## 2019-09-04 RX ORDER — CHOLESTYRAMINE LIGHT 4 G/5.7G
1 POWDER, FOR SUSPENSION ORAL DAILY
Status: DISCONTINUED | OUTPATIENT
Start: 2019-09-05 | End: 2019-09-09 | Stop reason: HOSPADM

## 2019-09-04 RX ORDER — ACETAMINOPHEN 160 MG/5ML
650 SOLUTION ORAL EVERY 4 HOURS PRN
Status: DISCONTINUED | OUTPATIENT
Start: 2019-09-04 | End: 2019-09-09 | Stop reason: HOSPADM

## 2019-09-04 RX ORDER — ACETAMINOPHEN 650 MG/1
650 SUPPOSITORY RECTAL EVERY 4 HOURS PRN
Status: DISCONTINUED | OUTPATIENT
Start: 2019-09-04 | End: 2019-09-09 | Stop reason: HOSPADM

## 2019-09-04 RX ORDER — SODIUM CHLORIDE 0.9 % (FLUSH) 0.9 %
10 SYRINGE (ML) INJECTION EVERY 12 HOURS SCHEDULED
Status: DISCONTINUED | OUTPATIENT
Start: 2019-09-04 | End: 2019-09-09 | Stop reason: HOSPADM

## 2019-09-04 RX ORDER — TRIAMTERENE AND HYDROCHLOROTHIAZIDE 37.5; 25 MG/1; MG/1
1 TABLET ORAL DAILY
Status: DISCONTINUED | OUTPATIENT
Start: 2019-09-04 | End: 2019-09-09 | Stop reason: HOSPADM

## 2019-09-04 RX ORDER — GABAPENTIN 100 MG/1
100 CAPSULE ORAL NIGHTLY
Status: DISCONTINUED | OUTPATIENT
Start: 2019-09-04 | End: 2019-09-09 | Stop reason: HOSPADM

## 2019-09-04 RX ORDER — WARFARIN SODIUM 3 MG/1
3 TABLET ORAL
Status: DISCONTINUED | OUTPATIENT
Start: 2019-09-04 | End: 2019-09-09 | Stop reason: HOSPADM

## 2019-09-04 RX ORDER — PANTOPRAZOLE SODIUM 40 MG/1
40 TABLET, DELAYED RELEASE ORAL
Status: DISCONTINUED | OUTPATIENT
Start: 2019-09-05 | End: 2019-09-09 | Stop reason: HOSPADM

## 2019-09-04 RX ORDER — SODIUM CHLORIDE 0.9 % (FLUSH) 0.9 %
10 SYRINGE (ML) INJECTION AS NEEDED
Status: DISCONTINUED | OUTPATIENT
Start: 2019-09-04 | End: 2019-09-09 | Stop reason: HOSPADM

## 2019-09-04 RX ORDER — MECLIZINE HCL 12.5 MG/1
12.5 TABLET ORAL 3 TIMES DAILY PRN
Status: DISCONTINUED | OUTPATIENT
Start: 2019-09-04 | End: 2019-09-09 | Stop reason: HOSPADM

## 2019-09-04 RX ORDER — CLONIDINE HYDROCHLORIDE 0.1 MG/1
0.1 TABLET ORAL EVERY 8 HOURS PRN
Status: DISCONTINUED | OUTPATIENT
Start: 2019-09-04 | End: 2019-09-09

## 2019-09-04 RX ORDER — RANOLAZINE 500 MG/1
500 TABLET, EXTENDED RELEASE ORAL 2 TIMES DAILY
Status: DISCONTINUED | OUTPATIENT
Start: 2019-09-04 | End: 2019-09-09 | Stop reason: HOSPADM

## 2019-09-04 RX ORDER — ALPRAZOLAM 0.25 MG/1
0.12 TABLET ORAL NIGHTLY PRN
Status: DISCONTINUED | OUTPATIENT
Start: 2019-09-04 | End: 2019-09-09 | Stop reason: HOSPADM

## 2019-09-04 RX ADMIN — MEROPENEM 1 G: 1 INJECTION, POWDER, FOR SOLUTION INTRAVENOUS at 15:23

## 2019-09-04 RX ADMIN — WARFARIN SODIUM 3 MG: 3 TABLET ORAL at 19:03

## 2019-09-04 RX ADMIN — METOPROLOL TARTRATE 50 MG: 50 TABLET, FILM COATED ORAL at 22:05

## 2019-09-04 RX ADMIN — MEROPENEM 500 MG: 500 INJECTION, POWDER, FOR SOLUTION INTRAVENOUS at 22:43

## 2019-09-04 RX ADMIN — OXYBUTYNIN CHLORIDE 5 MG: 5 TABLET, EXTENDED RELEASE ORAL at 15:14

## 2019-09-04 RX ADMIN — SODIUM CHLORIDE, PRESERVATIVE FREE 10 ML: 5 INJECTION INTRAVENOUS at 22:06

## 2019-09-04 RX ADMIN — Medication 1 TABLET: at 15:14

## 2019-09-04 RX ADMIN — GABAPENTIN 100 MG: 100 CAPSULE ORAL at 22:04

## 2019-09-04 RX ADMIN — RANOLAZINE 500 MG: 500 TABLET, FILM COATED, EXTENDED RELEASE ORAL at 22:05

## 2019-09-04 NOTE — CONSULTS
8 cm Midline catheter placed in pt right brachial vein. Pt tolerated procedure well. Line flushes and draws well. Sterile dressing applied.

## 2019-09-04 NOTE — PLAN OF CARE
Problem: Patient Care Overview  Goal: Plan of Care Review  Outcome: Ongoing (interventions implemented as appropriate)   09/04/19 1529   Coping/Psychosocial   Plan of Care Reviewed With patient   Plan of Care Review   Progress no change   OTHER   Outcome Summary A&O x4. No c/o pain. IV started to LAC. Started iv abx therapy. Assist x1. VSS. Midline consult in place. Safety maintained. Will continue to monitor.     Goal: Individualization and Mutuality  Outcome: Ongoing (interventions implemented as appropriate)      Problem: Skin Injury Risk (Adult)  Goal: Identify Related Risk Factors and Signs and Symptoms  Outcome: Ongoing (interventions implemented as appropriate)    Goal: Skin Health and Integrity  Outcome: Ongoing (interventions implemented as appropriate)      Problem: Fall Risk (Adult)  Goal: Identify Related Risk Factors and Signs and Symptoms  Outcome: Ongoing (interventions implemented as appropriate)    Goal: Absence of Fall  Outcome: Ongoing (interventions implemented as appropriate)      Problem: Urinary Tract Infection (Adult)  Goal: Signs and Symptoms of Listed Potential Problems Will be Absent, Minimized or Managed (Urinary Tract Infection)  Outcome: Ongoing (interventions implemented as appropriate)

## 2019-09-05 LAB
ANION GAP SERPL CALCULATED.3IONS-SCNC: 4 MMOL/L (ref 4–13)
BASOPHILS # BLD AUTO: 0.02 10*3/MM3 (ref 0–0.2)
BASOPHILS NFR BLD AUTO: 0.4 % (ref 0–1.5)
BUN BLD-MCNC: 16 MG/DL (ref 5–21)
BUN/CREAT SERPL: 12.7 (ref 7–25)
CALCIUM SPEC-SCNC: 8.8 MG/DL (ref 8.4–10.4)
CHLORIDE SERPL-SCNC: 108 MMOL/L (ref 98–110)
CO2 SERPL-SCNC: 29 MMOL/L (ref 24–31)
CREAT BLD-MCNC: 1.26 MG/DL (ref 0.5–1.4)
DEPRECATED RDW RBC AUTO: 47.9 FL (ref 37–54)
EOSINOPHIL # BLD AUTO: 0.22 10*3/MM3 (ref 0–0.4)
EOSINOPHIL NFR BLD AUTO: 4.4 % (ref 0.3–6.2)
ERYTHROCYTE [DISTWIDTH] IN BLOOD BY AUTOMATED COUNT: 14.5 % (ref 12.3–15.4)
GFR SERPL CREATININE-BSD FRML MDRD: 40 ML/MIN/1.73
GLUCOSE BLD-MCNC: 78 MG/DL (ref 70–100)
HCT VFR BLD AUTO: 33.2 % (ref 34–46.6)
HGB BLD-MCNC: 10.7 G/DL (ref 12–15.9)
IMM GRANULOCYTES # BLD AUTO: 0.02 10*3/MM3 (ref 0–0.05)
IMM GRANULOCYTES NFR BLD AUTO: 0.4 % (ref 0–0.5)
INR PPP: 1.29 (ref 0.91–1.09)
LYMPHOCYTES # BLD AUTO: 0.71 10*3/MM3 (ref 0.7–3.1)
LYMPHOCYTES NFR BLD AUTO: 14.3 % (ref 19.6–45.3)
MCH RBC QN AUTO: 29.2 PG (ref 26.6–33)
MCHC RBC AUTO-ENTMCNC: 32.2 G/DL (ref 31.5–35.7)
MCV RBC AUTO: 90.5 FL (ref 79–97)
MONOCYTES # BLD AUTO: 0.45 10*3/MM3 (ref 0.1–0.9)
MONOCYTES NFR BLD AUTO: 9 % (ref 5–12)
NEUTROPHILS # BLD AUTO: 3.56 10*3/MM3 (ref 1.7–7)
NEUTROPHILS NFR BLD AUTO: 71.5 % (ref 42.7–76)
NRBC BLD AUTO-RTO: 0 /100 WBC (ref 0–0.2)
PLATELET # BLD AUTO: 541 10*3/MM3 (ref 140–450)
PMV BLD AUTO: 9.8 FL (ref 6–12)
POTASSIUM BLD-SCNC: 4.6 MMOL/L (ref 3.5–5.3)
PROTHROMBIN TIME: 16.5 SECONDS (ref 11.9–14.6)
RBC # BLD AUTO: 3.67 10*6/MM3 (ref 3.77–5.28)
SODIUM BLD-SCNC: 141 MMOL/L (ref 135–145)
WBC NRBC COR # BLD: 4.98 10*3/MM3 (ref 3.4–10.8)

## 2019-09-05 PROCEDURE — 96366 THER/PROPH/DIAG IV INF ADDON: CPT

## 2019-09-05 PROCEDURE — 80048 BASIC METABOLIC PNL TOTAL CA: CPT | Performed by: FAMILY MEDICINE

## 2019-09-05 PROCEDURE — G0378 HOSPITAL OBSERVATION PER HR: HCPCS

## 2019-09-05 PROCEDURE — 25010000002 MEROPENEM PER 100 MG: Performed by: FAMILY MEDICINE

## 2019-09-05 PROCEDURE — 85025 COMPLETE CBC W/AUTO DIFF WBC: CPT | Performed by: FAMILY MEDICINE

## 2019-09-05 PROCEDURE — 25010000002 MEROPENEM PER 100 MG: Performed by: INTERNAL MEDICINE

## 2019-09-05 PROCEDURE — 85610 PROTHROMBIN TIME: CPT | Performed by: FAMILY MEDICINE

## 2019-09-05 RX ADMIN — Medication 1 TABLET: at 08:52

## 2019-09-05 RX ADMIN — RANOLAZINE 500 MG: 500 TABLET, FILM COATED, EXTENDED RELEASE ORAL at 08:52

## 2019-09-05 RX ADMIN — METOPROLOL TARTRATE 50 MG: 50 TABLET, FILM COATED ORAL at 21:36

## 2019-09-05 RX ADMIN — SODIUM CHLORIDE, PRESERVATIVE FREE 10 ML: 5 INJECTION INTRAVENOUS at 21:35

## 2019-09-05 RX ADMIN — SODIUM CHLORIDE, PRESERVATIVE FREE 10 ML: 5 INJECTION INTRAVENOUS at 08:53

## 2019-09-05 RX ADMIN — GABAPENTIN 100 MG: 100 CAPSULE ORAL at 21:35

## 2019-09-05 RX ADMIN — MEROPENEM 500 MG: 500 INJECTION, POWDER, FOR SOLUTION INTRAVENOUS at 09:00

## 2019-09-05 RX ADMIN — METOPROLOL TARTRATE 50 MG: 50 TABLET, FILM COATED ORAL at 08:52

## 2019-09-05 RX ADMIN — MEROPENEM 500 MG: 500 INJECTION, POWDER, FOR SOLUTION INTRAVENOUS at 21:35

## 2019-09-05 RX ADMIN — WARFARIN SODIUM 4.5 MG: 2 TABLET ORAL at 16:50

## 2019-09-05 RX ADMIN — CHOLESTYRAMINE 4 G: 4 POWDER, FOR SUSPENSION ORAL at 08:52

## 2019-09-05 RX ADMIN — OXYBUTYNIN CHLORIDE 5 MG: 5 TABLET, EXTENDED RELEASE ORAL at 08:52

## 2019-09-05 RX ADMIN — RANOLAZINE 500 MG: 500 TABLET, FILM COATED, EXTENDED RELEASE ORAL at 21:36

## 2019-09-05 RX ADMIN — TRIAMTERENE AND HYDROCHLOROTHIAZIDE 1 TABLET: 37.5; 25 TABLET ORAL at 08:52

## 2019-09-05 RX ADMIN — PANTOPRAZOLE SODIUM 40 MG: 40 TABLET, DELAYED RELEASE ORAL at 06:21

## 2019-09-05 NOTE — PROGRESS NOTES
Discharge Planning Assessment  [facility]     Patient Name: Sana Laboy  MRN: [id number]  Today's Date: 9/5/2019    Admit Date: 9/4/2019    Discharge Needs Assessment     Row Name 09/05/19 1635       Living Environment    Lives With  alone    Current Living Arrangements  home/apartment/condo    Primary Care Provided by  self    Provides Primary Care For  no one    Family Caregiver if Needed  child(kristofer), adult    Family Caregiver Names  DTR (MARISOL)    Quality of Family Relationships  helpful;involved    Able to Return to Prior Arrangements  yes       Resource/Environmental Concerns    Resource/Environmental Concerns  none    Transportation Concerns  car, none       Transition Planning    Patient/Family Anticipates Transition to  home with help/services    Patient/Family Anticipated Services at Transition  home health care    Transportation Anticipated  family or friend will provide       Discharge Needs Assessment    Readmission Within the Last 30 Days  no previous admission in last 30 days    Concerns to be Addressed  denies needs/concerns at this time    Equipment Currently Used at Home  bath bench;walker, standard    Anticipated Changes Related to Illness  none    Equipment Needed After Discharge  none    Outpatient/Agency/Support Group Needs  homecare agency    Discharge Facility/Level of Care Needs  home with home health    Discharge Coordination/Progress  PT LIVES AT HOME ALONE BUT HAS SUPPORTIVE FAMILY THAT CAN ASSIST. PT STATES SHE PLANS TO RETURN HOME WHEN READY. PT IS AGREEABLE TO HOME HEALTH. PT HAS BEEN HOME ON IV ABX/HH IN THE PAST. PT STATES SHE HAS DME AT HOME. WILL FOLLOW FOR POSSIBLE IV ABX.         Discharge Plan    No documentation.       Destination      No service coordination in this encounter.      Durable Medical Equipment      No service coordination in this encounter.      Dialysis/Infusion      No service coordination in this encounter.      Home Medical Care      No service coordination  in this encounter.      Therapy      No service coordination in this encounter.      Community Resources      No service coordination in this encounter.          Demographic Summary    No documentation.       Functional Status    No documentation.       Psychosocial    No documentation.       Abuse/Neglect    No documentation.       Legal    No documentation.       Substance Abuse    No documentation.       Patient Forms    No documentation.           HERMELINDA Ibrahim

## 2019-09-05 NOTE — CONSULTS
INFECTIOUS DISEASES CONSULT NOTE    Patient:  Sana Laboy 89 y.o. female  ROOM # 347/1  YOB: 1924  MRN: [id number]  [facility]:  [id number]  Admit date: 9/4/2019   Admitting Physician: Aaron Lyons MD  Primary Care Physician: Aaron Lyons MD  REFERRING PROVIDER: Aaron Lyons MD    Reason for Consultation: Persistent ESBL positive E. Coli UTI    History of Present Illness/Chief Complaint: Pleasant 89-year-old woman.  Asked to evaluate for recurrent/persistent ESBL positive E. coli urinary tract infection.  She indicates she has some problems with urinary urgency.  At times she has some incontinence.  She is not having fevers or chills.  I get the sense symptoms have been present for quite a long time.  She does indicate for short while after treatment in March she had felt a little bit better.  She has then had some recurrent intermittent urinary symptoms that have not responded to additional courses of oral antibiotic treatment.  She indicates recently her appetite has been somewhat diminished.  She has lost a few pounds.  When asked about diarrhea she indicates she does have some intermittent loose watery bowel movements.  She indicates so far she has been able to attend to her own urinary/bowel program, but her fecal urgency can be problematic at times.  Infectious disease asked to evaluate and offer recommendations for treatment.    Current Scheduled Medications:     calcium carb-cholecalciferol 1 tablet Oral Daily   cholestyramine light 1 packet Oral Daily   gabapentin 100 mg Oral Nightly   meropenem 500 mg Intravenous Q12H   metoprolol tartrate 50 mg Oral BID   oxybutynin XL 5 mg Oral Daily   pantoprazole 40 mg Oral Q AM   ranolazine 500 mg Oral BID   sodium chloride 10 mL Intravenous Q12H   triamterene-hydrochlorothiazide 1 tablet Oral Daily   warfarin 3 mg Oral Once per day on Sun Mon Wed Fri Sat   warfarin 4.5 mg Oral Once per day on Tue Thu     Current PRN  "Medications:  •  acetaminophen **OR** acetaminophen **OR** acetaminophen  •  ALPRAZolam  •  CloNIDine  •  meclizine  •  sodium chloride    Allergies:    Allergies   Allergen Reactions   • Ciprofloxacin Diarrhea   • Darvon [Propoxyphene] Nausea And Vomiting   • Bactrim [Sulfamethoxazole-Trimethoprim] Rash     Past Medical History: Hypertension.  Colon cancer.  Venous thrombosis.     Past Surgical History: Previous partial colectomy.  IVC filter placement.     Social History: No tobacco or alcohol use.  Lives alone with support of family.       Family History: Noncontributory     Exposure History: No close contacts have been ill Abdomen soft and nontender.    Review of Systems  No chest pain or chest pressure  No cough or sputum production  No abdominal pain  No neurological symptoms    Vital Signs:  /53 (BP Location: Left arm, Patient Position: Lying)   Pulse 70   Temp 98.4 °F (36.9 °C) (Oral)   Resp 16   Ht 160 cm (63\")   Wt 59.9 kg (132 lb)   SpO2 93%   BMI 23.38 kg/m²  Temp (24hrs), Av.1 °F (36.7 °C), Min:97.8 °F (36.6 °C), Max:98.4 °F (36.9 °C)    Physical Exam  Vital signs - reviewed.  Line/IV (peripheral) site - No erythema or tenderness.  General alert, oriented, no distress  Lungs clear to auscultation without crackles  Heart without murmur  Abdomen soft and nontender.  No suprapubic or flank tenderness.  No hepatosplenomegaly.  Extremities no edema    Lab Results:  CBC: Results from last 7 days   Lab Units 19  0706 19  1454   WBC 10*3/mm3 4.98 5.98   HEMOGLOBIN g/dL 10.7* 11.6*   HEMATOCRIT % 33.2* 35.8   PLATELETS 10*3/mm3 541* 590*     CMP: Results from last 7 days   Lab Units 19  0706 19  1454   SODIUM mmol/L 141 140   POTASSIUM mmol/L 4.6 4.0   CHLORIDE mmol/L 108 106   CO2 mmol/L 29.0 27.0   BUN mg/dL 16 19   CREATININE mg/dL 1.26 1.31   CALCIUM mg/dL 8.8 8.9   GLUCOSE mg/dL 78 97     Component  Ref Range & Units 1d ago 3mo ago   RBC, UA  None Seen /HPF 0-2 " Abnormal   3 R   WBC, UA  None Seen /HPF 21-30 Abnormal   352 Abnormally high  R   Bacteria, UA  None Seen /HPF 1+ Abnormal   3+ R   Squamous Epithelial Cells, UA  None Seen, 0-2 /HPF 0-2     Hyaline Casts, UA  None Seen /LPF None Seen  1 R   Methodology Automated Microscopy       Urine culture pending    Radiology: Per review of epic-CT scan of the abdomen and pelvis done March 4, 2019 using renal stone protocol without contrast:  IMPRESSION:  1. Mild bladder wall thickening which may be related to cystitis or  incomplete bladder distention. No evidence of hydronephrosis or kidney  stones. The ureters are decompressed.  2. New small 9 mm low-attenuation lesion in the inferomedial right  hepatic lobe, possibly a cyst. This may not been seen on the previous  study due to the phase of contrast. There is a stable benign-appearing  coarse calcification in the liver dome.  3. Stable low-attenuation adenoma in the right adrenal gland. This  measures up to 2.7 cm.  4. No free fluid or free air.  5. Stable IVC filter.  6. Advanced degenerative changes of the lower lumbar spine.  This report was finalized on 03/04/2019 22:13 by Dr. Santiago Roth MD.    Additional Studies Reviewed:     Impression:   ESBL positive E. coli UTI in the past.  Current urine culture pending.  Suspect may be ESBL positive E. coli.  She could potentially have an area of focal nephronia.  CT scan back in March did not appear to show abscess or obstructing stone.  Difficult to know best way to manage.    Recommendations:    Would continue treatment with meropenem  Recommend renal ultrasound to look for obstruction or abscess  Would like to get abdomen pelvis CT scan with intravenous contrast, but will avoid due to renal dysfunction  Await urine culture  Continue to follow    Danie Johnson MD  09/05/19  3:54 PM

## 2019-09-05 NOTE — PLAN OF CARE
Problem: Patient Care Overview  Goal: Plan of Care Review  Outcome: Ongoing (interventions implemented as appropriate)   09/05/19 0503   Coping/Psychosocial   Plan of Care Reviewed With patient   Plan of Care Review   Progress no change   OTHER   Outcome Summary Patient denies any discomfort. Continues to have frequent urination. Awaiting micro result of urine culture. VSS. Midline RUE. Receiving IV abx. Safety maintained.      Goal: Individualization and Mutuality  Outcome: Ongoing (interventions implemented as appropriate)      Problem: Skin Injury Risk (Adult)  Goal: Identify Related Risk Factors and Signs and Symptoms  Outcome: Ongoing (interventions implemented as appropriate)    Goal: Skin Health and Integrity  Outcome: Ongoing (interventions implemented as appropriate)      Problem: Fall Risk (Adult)  Goal: Identify Related Risk Factors and Signs and Symptoms  Outcome: Ongoing (interventions implemented as appropriate)    Goal: Absence of Fall  Outcome: Outcome(s) achieved Date Met: 09/05/19      Problem: Urinary Tract Infection (Adult)  Goal: Signs and Symptoms of Listed Potential Problems Will be Absent, Minimized or Managed (Urinary Tract Infection)  Outcome: Ongoing (interventions implemented as appropriate)

## 2019-09-05 NOTE — H&P
Patient Care Team:  Aaron Lyons MD as PCP - General  Aaron Lyons MD as PCP - Family Medicine    Chief complaint urinary tract infection from multi resistant E. coli species with failed outpatient infusion therapy    Subjective     Patient is a 89 y.o. female presents with urinary tract infection. Onset of symptoms was gradual starting a few weeks ago.  Symptoms are associated with decreased appetite, urinary frequency and incontinence, and mild suprapubic discomfort.  Symptoms are aggravated by nothing in particular.   Symptoms improve with antibiotics.  Patient received a one-time dose of gentamicin for which the organism showed sensitivity to as an outpatient.  She had some improvement in symptoms however only for a few days.  That was repeated and the same thing happened.  Given failure of that regimen, as well as the fact that she lives some distance away and there was difficulty arranging home health to administer antibiotics at home decision was made to admit and start different regimen of antibiotics as well as to get infectious disease opinion on type of antibiotics and time for treatment. Severity moderate.  Context numerous urinary tract infections in the past.      Review of Systems   Pertinent items are noted in HPI    History  Past Medical History:   Diagnosis Date   • Cancer (CMS/HCC)    • Elevated cholesterol    • Esophageal reflux 3/4/2019   • Hypertension      History reviewed. No pertinent surgical history.  History reviewed. No pertinent family history.  Social History     Tobacco Use   • Smoking status: Never Smoker   • Smokeless tobacco: Never Used   Substance Use Topics   • Alcohol use: No     Frequency: Never   • Drug use: No     Medications Prior to Admission   Medication Sig Dispense Refill Last Dose   • calcium citrate-vitamin d (CITRACAL) 200-250 MG-UNIT tablet tablet Take  by mouth Daily.   9/3/2019 at Unknown time   • colestipol (COLESTID) 1 g tablet Take 1 g by  mouth 3 (Three) Times a Day.   9/4/2019 at Unknown time   • esomeprazole (nexIUM) 40 MG capsule Take 40 mg by mouth Every Morning Before Breakfast.   9/4/2019 at Unknown time   • gabapentin (NEURONTIN) 100 MG capsule Take 100 mg by mouth every night at bedtime.   9/3/2019 at Unknown time   • metoprolol tartrate (LOPRESSOR) 100 MG tablet Take 50 mg by mouth 2 (Two) Times a Day.   9/4/2019 at Unknown time   • Mirabegron ER (MYRBETRIQ) 25 MG tablet sustained-release 24 hour 24 hr tablet Take 25 mg by mouth Daily.   9/3/2019 at Unknown time   • Multiple Vitamins-Minerals (ICAPS MV PO) Take 1 capsule by mouth every night at bedtime.      • Multiple Vitamins-Minerals (MULTIVITAMIN ADULT PO) Take 1 tablet by mouth Daily.   9/3/2019 at Unknown time   • raloxifene (EVISTA) 60 MG tablet Take 60 mg by mouth Every Night.   9/3/2019 at Unknown time   • ranolazine (RANEXA) 500 MG 12 hr tablet Take 500 mg by mouth 2 (Two) Times a Day.   9/4/2019 at Unknown time   • warfarin (COUMADIN) 3 MG tablet Take 3 mg by mouth Take As Directed. Sunday, Monday, Wednesday, Friday, and Saturday.   Past Week at Unknown time   • warfarin (COUMADIN) 3 MG tablet Take 4.5 mg by mouth 2 (Two) Times a Week. Tuesday and Thursday.   9/3/2019 at Unknown time   • ALPRAZolam (XANAX) 0.25 MG tablet Take 0.125 mg by mouth At Night As Needed for Sleep.   More than a month at Unknown time   • CloNIDine (CATAPRES) 0.1 MG tablet Take 0.1 mg by mouth Every 8 (Eight) Hours As Needed for High Blood Pressure (SBP > 170).   More than a month at Unknown time   • meclizine (ANTIVERT) 12.5 MG tablet Take 12.5 mg by mouth 3 (Three) Times a Day As Needed for dizziness.   More than a month at Unknown time   • triamterene-hydrochlorothiazide (DYAZIDE) 37.5-25 MG per capsule Take 1 capsule by mouth Every Other Day.   Unknown at Unknown time     Allergies:  Ciprofloxacin; Darvon [propoxyphene]; and Bactrim [sulfamethoxazole-trimethoprim]    Objective     Vital Signs  Temp:   [97.8 °F (36.6 °C)-98.4 °F (36.9 °C)] 98 °F (36.7 °C)  Heart Rate:  [66-83] 72  Resp:  [16] 16  BP: (117-142)/(54-76) 141/73    Physical Exam:    General Appearance: alert, appears stated age and cooperative  Head: normocephalic, without obvious abnormality and atraumatic  Eyes: lids and lashes normal, no icterus and PERRLA  Neck: supple, trachea midline, no thyromegaly and no carotid bruit  Lungs: clear to auscultation, respirations regular, respirations even and respirations unlabored  Heart: regular rhythm & normal rate, normal S1, S2 and no murmur, no gallop, no rub  Abdomen: normal bowel sounds, no masses, no guarding, no rebound tenderness and very mild suprapubic tenderness  Extremities: no edema, no cyanosis and no redness  Pulses: Pulses palpable and equal bilaterally  Skin: turgor normal and color normal  Neurologic: Mental Status orientated to person, place, time and situation, Cranial Nerves cranial nerves 2 - 12 grossly intact as examined    Results Review:    I reviewed the patient's new clinical results.    Assessment/Plan       Urinary tract infection due to extended-spectrum beta lactamase (ESBL) producing Escherichia coli    Action tremor    Colon cancer (CMS/HCC)    Chronic kidney disease, stage II (mild)    Osteoporosis, post-menopausal      Patient admitted again for IV antibiotics.  A midline has been placed for ease of access.  She has been started on Merrem given previous outpatient culture results.  Those were done in my office through the [facility] (can make those available if needed).  I repeated the urinalysis for confirmation.  Hopefully can make plan for treatment and resolution of issue.    I discussed the patients findings and my recommendations with patient.     Aaron Lyons MD  09/05/19  8:04 AM    Time: 50 minutes

## 2019-09-06 ENCOUNTER — APPOINTMENT (OUTPATIENT)
Dept: ULTRASOUND IMAGING | Facility: HOSPITAL | Age: 84
End: 2019-09-06

## 2019-09-06 LAB
ANION GAP SERPL CALCULATED.3IONS-SCNC: 7 MMOL/L (ref 4–13)
BACTERIA SPEC AEROBE CULT: ABNORMAL
BASOPHILS # BLD AUTO: 0.02 10*3/MM3 (ref 0–0.2)
BASOPHILS NFR BLD AUTO: 0.4 % (ref 0–1.5)
BUN BLD-MCNC: 16 MG/DL (ref 5–21)
BUN/CREAT SERPL: 12.6 (ref 7–25)
CALCIUM SPEC-SCNC: 8.5 MG/DL (ref 8.4–10.4)
CHLORIDE SERPL-SCNC: 106 MMOL/L (ref 98–110)
CO2 SERPL-SCNC: 25 MMOL/L (ref 24–31)
CREAT BLD-MCNC: 1.27 MG/DL (ref 0.5–1.4)
DEPRECATED RDW RBC AUTO: 49.1 FL (ref 37–54)
EOSINOPHIL # BLD AUTO: 0.21 10*3/MM3 (ref 0–0.4)
EOSINOPHIL NFR BLD AUTO: 4.2 % (ref 0.3–6.2)
ERYTHROCYTE [DISTWIDTH] IN BLOOD BY AUTOMATED COUNT: 14.5 % (ref 12.3–15.4)
GFR SERPL CREATININE-BSD FRML MDRD: 39 ML/MIN/1.73
GLUCOSE BLD-MCNC: 86 MG/DL (ref 70–100)
HCT VFR BLD AUTO: 33.2 % (ref 34–46.6)
HGB BLD-MCNC: 10.7 G/DL (ref 12–15.9)
IMM GRANULOCYTES # BLD AUTO: 0.01 10*3/MM3 (ref 0–0.05)
IMM GRANULOCYTES NFR BLD AUTO: 0.2 % (ref 0–0.5)
INR PPP: 1.16 (ref 0.91–1.09)
LYMPHOCYTES # BLD AUTO: 0.45 10*3/MM3 (ref 0.7–3.1)
LYMPHOCYTES NFR BLD AUTO: 9 % (ref 19.6–45.3)
MCH RBC QN AUTO: 29.7 PG (ref 26.6–33)
MCHC RBC AUTO-ENTMCNC: 32.2 G/DL (ref 31.5–35.7)
MCV RBC AUTO: 92.2 FL (ref 79–97)
MONOCYTES # BLD AUTO: 0.48 10*3/MM3 (ref 0.1–0.9)
MONOCYTES NFR BLD AUTO: 9.6 % (ref 5–12)
NEUTROPHILS # BLD AUTO: 3.82 10*3/MM3 (ref 1.7–7)
NEUTROPHILS NFR BLD AUTO: 76.6 % (ref 42.7–76)
NRBC BLD AUTO-RTO: 0 /100 WBC (ref 0–0.2)
PLATELET # BLD AUTO: 442 10*3/MM3 (ref 140–450)
PMV BLD AUTO: 10.1 FL (ref 6–12)
POTASSIUM BLD-SCNC: 4.5 MMOL/L (ref 3.5–5.3)
PROTHROMBIN TIME: 15.2 SECONDS (ref 11.9–14.6)
RBC # BLD AUTO: 3.6 10*6/MM3 (ref 3.77–5.28)
SODIUM BLD-SCNC: 138 MMOL/L (ref 135–145)
WBC NRBC COR # BLD: 4.99 10*3/MM3 (ref 3.4–10.8)

## 2019-09-06 PROCEDURE — G0378 HOSPITAL OBSERVATION PER HR: HCPCS

## 2019-09-06 PROCEDURE — 25010000002 MEROPENEM PER 100 MG: Performed by: INTERNAL MEDICINE

## 2019-09-06 PROCEDURE — 94799 UNLISTED PULMONARY SVC/PX: CPT

## 2019-09-06 PROCEDURE — 76775 US EXAM ABDO BACK WALL LIM: CPT

## 2019-09-06 PROCEDURE — 85025 COMPLETE CBC W/AUTO DIFF WBC: CPT | Performed by: FAMILY MEDICINE

## 2019-09-06 PROCEDURE — 80048 BASIC METABOLIC PNL TOTAL CA: CPT | Performed by: FAMILY MEDICINE

## 2019-09-06 PROCEDURE — 85610 PROTHROMBIN TIME: CPT | Performed by: FAMILY MEDICINE

## 2019-09-06 PROCEDURE — 96366 THER/PROPH/DIAG IV INF ADDON: CPT

## 2019-09-06 RX ADMIN — RANOLAZINE 500 MG: 500 TABLET, FILM COATED, EXTENDED RELEASE ORAL at 20:43

## 2019-09-06 RX ADMIN — TRIAMTERENE AND HYDROCHLOROTHIAZIDE 1 TABLET: 37.5; 25 TABLET ORAL at 10:09

## 2019-09-06 RX ADMIN — CHOLESTYRAMINE 4 G: 4 POWDER, FOR SUSPENSION ORAL at 10:10

## 2019-09-06 RX ADMIN — GABAPENTIN 100 MG: 100 CAPSULE ORAL at 20:43

## 2019-09-06 RX ADMIN — WARFARIN SODIUM 3 MG: 3 TABLET ORAL at 16:54

## 2019-09-06 RX ADMIN — METOPROLOL TARTRATE 50 MG: 50 TABLET, FILM COATED ORAL at 20:43

## 2019-09-06 RX ADMIN — OXYBUTYNIN CHLORIDE 5 MG: 5 TABLET, EXTENDED RELEASE ORAL at 10:09

## 2019-09-06 RX ADMIN — RANOLAZINE 500 MG: 500 TABLET, FILM COATED, EXTENDED RELEASE ORAL at 10:10

## 2019-09-06 RX ADMIN — Medication 1 TABLET: at 10:10

## 2019-09-06 RX ADMIN — SODIUM CHLORIDE, PRESERVATIVE FREE 10 ML: 5 INJECTION INTRAVENOUS at 10:09

## 2019-09-06 RX ADMIN — METOPROLOL TARTRATE 50 MG: 50 TABLET, FILM COATED ORAL at 10:09

## 2019-09-06 RX ADMIN — SODIUM CHLORIDE, PRESERVATIVE FREE 10 ML: 5 INJECTION INTRAVENOUS at 20:44

## 2019-09-06 RX ADMIN — MEROPENEM 500 MG: 500 INJECTION, POWDER, FOR SOLUTION INTRAVENOUS at 21:07

## 2019-09-06 RX ADMIN — MEROPENEM 500 MG: 500 INJECTION, POWDER, FOR SOLUTION INTRAVENOUS at 10:08

## 2019-09-06 NOTE — PROGRESS NOTES
"Infectious Diseases Progress Note    Patient:  Sana Laboy  YOB: 1924  MRN: [id number]   Admit date: 9/4/2019   Admitting Physician: Aaron Lyons MD  Primary Care Physician: Aaron Lyons MD    Chief Complaint/Interval History: She feels okay.  She is without fevers or chills.  No pain or discomfort.  Had renal ultrasound this morning.  Discussed with Dr. Peoples yesterday evening.  Talked with patient and daughter this morning.  They are agreeable to home/outpatient antibiotic treatment.    Intake/Output Summary (Last 24 hours) at 9/6/2019 0923  Last data filed at 9/5/2019 2135  Gross per 24 hour   Intake 300 ml   Output --   Net 300 ml     Allergies:   Allergies   Allergen Reactions   • Ciprofloxacin Diarrhea   • Darvon [Propoxyphene] Nausea And Vomiting   • Bactrim [Sulfamethoxazole-Trimethoprim] Rash     Current Scheduled Medications:     calcium carb-cholecalciferol 1 tablet Oral Daily   cholestyramine light 1 packet Oral Daily   gabapentin 100 mg Oral Nightly   meropenem 500 mg Intravenous Q12H   metoprolol tartrate 50 mg Oral BID   oxybutynin XL 5 mg Oral Daily   pantoprazole 40 mg Oral Q AM   ranolazine 500 mg Oral BID   sodium chloride 10 mL Intravenous Q12H   triamterene-hydrochlorothiazide 1 tablet Oral Daily   warfarin 3 mg Oral Once per day on Sun Mon Wed Fri Sat   warfarin 4.5 mg Oral Once per day on Tue Thu     Current PRN Medications:  •  acetaminophen **OR** acetaminophen **OR** acetaminophen  •  ALPRAZolam  •  CloNIDine  •  meclizine  •  sodium chloride    Review of Systems no cardiopulmonary complaints.    Vital Signs:  /73 (BP Location: Left arm, Patient Position: Lying)   Pulse 78   Temp 98.7 °F (37.1 °C) (Oral)   Resp 16   Ht 160 cm (63\")   Wt 59.9 kg (132 lb)   SpO2 95%   BMI 23.38 kg/m²     Physical Exam  Vital signs - reviewed.  Line/IV (right arm midline and left arm peripheral) site - No erythema, warmth, induration, or tenderness.  General " - alert, oriented, comfortable  Minimal suprapubic tenderness.  No significant flank tenderness.  Skin without rash.    Lab Results:  CBC: Results from last 7 days   Lab Units 09/06/19  0557 09/05/19  0706 09/04/19  1454   WBC 10*3/mm3 4.99 4.98 5.98   HEMOGLOBIN g/dL 10.7* 10.7* 11.6*   HEMATOCRIT % 33.2* 33.2* 35.8   PLATELETS 10*3/mm3 442 541* 590*     BMP:  Results from last 7 days   Lab Units 09/06/19  0557 09/05/19  0706 09/04/19  1454   SODIUM mmol/L 138 141 140   POTASSIUM mmol/L 4.5 4.6 4.0   CHLORIDE mmol/L 106 108 106   CO2 mmol/L 25.0 29.0 27.0   BUN mg/dL 16 16 19   CREATININE mg/dL 1.27 1.26 1.31   GLUCOSE mg/dL 86 78 97   CALCIUM mg/dL 8.5 8.8 8.9     Culture Results:   Urine Culture   Date Value Ref Range Status   09/04/2019 >100,000 CFU/mL Escherichia coli ESBL (C)  Final     Comment:       Consider infectious disease consult.  Susceptibility results may not correlate to clinical outcomes.   Susceptibility      Escherichia coli ESBL     RASTA     Ampicillin >=32 ug/ml Resistant     Ampicillin + Sulbactam >=32 ug/ml Resistant     Cefazolin >=64 ug/ml Resistant     Cefepime >=64 ug/ml Resistant     Ceftriaxone >=64 ug/ml Resistant     Ertapenem <=0.5 ug/ml Susceptible     ESBL Confirmation Test POS ug/ml Positive     Gentamicin <=1 ug/ml Susceptible     Levofloxacin >=8 ug/ml Resistant     Meropenem <=0.25 ug/ml Susceptible     Nitrofurantoin 128 ug/ml Resistant     Trimethoprim + Sulfamethoxazole <=20 ug/ml Susceptible      Radiology: Verbal preliminary report on renal ultrasound-no hydronephrosis.  No renal or perinephric abscess.    Additional Studies Reviewed: None    Impression:   Recurrent urinary tract infection with ESBL positive E. coli.  Suspect she may have chronic cystitis.  Would be great to treat her with longer course of oral trimethoprim sulfamethoxazole, but she lists a rash allergy.  There are no other oral alternatives.  Intravenous ertapenem would be the best option from an  intravenous standpoint given its once daily dosing.  At this point feel evidence points most toward chronic cystitis as opposed to nephrolithiasis, abscess, or focal nephronia.    Recommendations:   Would suggest 2 weeks of intravenous ertapenem  She has a midline catheter in place  See home IV antibiotic orders outlined below  Consult sent to  to help make arrangements for home intravenous antibiotic treatment    Home IV antibiotic orders:  1.  Diagnosis-recurrent ESBL positive E. coli UTI  2.  Outpatient primary care provider-Aaron Peoples MD  3.  Home antibiotic order:  Ertapenem 500 mg IV daily through September 20, 2019  4.  Lab on Monday, September 13, 2019-CMP and CBC  Call if creatinine greater than 1.5  5.  Pull midline catheter after her last dose of ertapenem on September 20, 2019  6.  Follow-up appointment in 3 weeks    Danie Johnson MD

## 2019-09-06 NOTE — PLAN OF CARE
Problem: Patient Care Overview  Goal: Plan of Care Review  Outcome: Ongoing (interventions implemented as appropriate)   09/06/19 8576   Coping/Psychosocial   Plan of Care Reviewed With patient   Plan of Care Review   Progress improving   OTHER   Outcome Summary A&Ox4. Stand by assist. No c/o pain. IV ABX . Contact precautions maintained. VSS. Safety maintained. Familt at bedside.      Goal: Discharge Needs Assessment  Outcome: Ongoing (interventions implemented as appropriate)    Goal: Interprofessional Rounds/Family Conf  Outcome: Ongoing (interventions implemented as appropriate)      Problem: Skin Injury Risk (Adult)  Goal: Identify Related Risk Factors and Signs and Symptoms  Outcome: Ongoing (interventions implemented as appropriate)    Goal: Skin Health and Integrity  Outcome: Ongoing (interventions implemented as appropriate)      Problem: Fall Risk (Adult)  Goal: Identify Related Risk Factors and Signs and Symptoms  Outcome: Ongoing (interventions implemented as appropriate)      Problem: Urinary Tract Infection (Adult)  Goal: Signs and Symptoms of Listed Potential Problems Will be Absent, Minimized or Managed (Urinary Tract Infection)  Outcome: Ongoing (interventions implemented as appropriate)

## 2019-09-06 NOTE — PLAN OF CARE
Problem: Patient Care Overview  Goal: Plan of Care Review  Outcome: Ongoing (interventions implemented as appropriate)   09/06/19 0351   Coping/Psychosocial   Plan of Care Reviewed With patient   Plan of Care Review   Progress improving   OTHER   Outcome Summary No c/o pain. Still has frequent urination. VSS. Contact precautions maintained. Receiving iv abx. Urine culture + >100,000 E. Coli. Safety maintained.      Goal: Individualization and Mutuality  Outcome: Outcome(s) achieved Date Met: 09/06/19      Problem: Skin Injury Risk (Adult)  Goal: Identify Related Risk Factors and Signs and Symptoms  Outcome: Ongoing (interventions implemented as appropriate)    Goal: Skin Health and Integrity  Outcome: Ongoing (interventions implemented as appropriate)      Problem: Fall Risk (Adult)  Goal: Identify Related Risk Factors and Signs and Symptoms  Outcome: Ongoing (interventions implemented as appropriate)      Problem: Urinary Tract Infection (Adult)  Goal: Signs and Symptoms of Listed Potential Problems Will be Absent, Minimized or Managed (Urinary Tract Infection)  Outcome: Ongoing (interventions implemented as appropriate)

## 2019-09-06 NOTE — PROGRESS NOTES
Family Medicine Progress Note    Patient:  Sana Laboy  YOB: 1924    MRN: [id number]     Acct: [id number]     Admit date: 9/4/2019    Patient Seen, Chart, Consults notes, Labs, Radiology studies reviewed.    Subjective: Day 0 of stay with ESBL E. coli urinary tract infection and most recent (in last 24 hours) has had no new complaints.  Is resting comfortably.  Tolerating antibiotics.    Past, Family, Social History unchanged from admission.    Diet: Diet Regular    Medications:  Scheduled Meds:  calcium carb-cholecalciferol 1 tablet Oral Daily   cholestyramine light 1 packet Oral Daily   gabapentin 100 mg Oral Nightly   meropenem 500 mg Intravenous Q12H   metoprolol tartrate 50 mg Oral BID   oxybutynin XL 5 mg Oral Daily   pantoprazole 40 mg Oral Q AM   ranolazine 500 mg Oral BID   sodium chloride 10 mL Intravenous Q12H   triamterene-hydrochlorothiazide 1 tablet Oral Daily   warfarin 3 mg Oral Once per day on Sun Mon Wed Fri Sat   warfarin 4.5 mg Oral Once per day on Tue Thu     Continuous Infusions:   PRN Meds:•  acetaminophen **OR** acetaminophen **OR** acetaminophen  •  ALPRAZolam  •  CloNIDine  •  meclizine  •  sodium chloride    Objective:    Lab Results (last 24 hours)     Procedure Component Value Units Date/Time    Urine Culture - Urine, Urine, Catheter In/Out [[id number]]  (Abnormal)  (Susceptibility) Collected:  09/04/19 1601    Specimen:  Urine, Catheter In/Out Updated:  09/06/19 0715     Urine Culture >100,000 CFU/mL Escherichia coli ESBL     Comment:   Consider infectious disease consult.  Susceptibility results may not correlate to clinical outcomes.       Narrative:       Organisms producing extended spectrum beta lactamase are clinically resistant to therapy with penicillins, cephalosporins, or aztreonam despite apparent in vitro susceptibility.  Therapeutic failure results from treatment with these agents.    Susceptibility      Escherichia coli ESBL     RASTA     Ampicillin  Resistant     Ampicillin + Sulbactam Resistant     Cefazolin Resistant     Cefepime Resistant     Ceftriaxone Resistant     Ertapenem Susceptible     ESBL Confirmation Test Positive     Gentamicin Susceptible     Levofloxacin Resistant     Meropenem Susceptible     Nitrofurantoin Resistant     Trimethoprim + Sulfamethoxazole Susceptible                    Basic Metabolic Panel [[id number]]  (Abnormal) Collected:  09/06/19 0557    Specimen:  Blood Updated:  09/06/19 0656     Glucose 86 mg/dL      BUN 16 mg/dL      Creatinine 1.27 mg/dL      Sodium 138 mmol/L      Potassium 4.5 mmol/L      Chloride 106 mmol/L      CO2 25.0 mmol/L      Calcium 8.5 mg/dL      eGFR Non African Amer 39 mL/min/1.73      BUN/Creatinine Ratio 12.6     Anion Gap 7.0 mmol/L     Narrative:       GFR Normal >60  Chronic Kidney Disease <60  Kidney Failure <15    Protime-INR [[id number]]  (Abnormal) Collected:  09/06/19 0557    Specimen:  Blood Updated:  09/06/19 0643     Protime 15.2 Seconds      INR 1.16    CBC & Differential [[id number]] Collected:  09/06/19 0557    Specimen:  Blood Updated:  09/06/19 0632    Narrative:       The following orders were created for panel order CBC & Differential.  Procedure                               Abnormality         Status                     ---------                               -----------         ------                     CBC Auto Differential[[id number]]        Abnormal            Final result                 Please view results for these tests on the individual orders.    CBC Auto Differential [[id number]]  (Abnormal) Collected:  09/06/19 0557    Specimen:  Blood Updated:  09/06/19 0632     WBC 4.99 10*3/mm3      RBC 3.60 10*6/mm3      Hemoglobin 10.7 g/dL      Hematocrit 33.2 %      MCV 92.2 fL      MCH 29.7 pg      MCHC 32.2 g/dL      RDW 14.5 %      RDW-SD 49.1 fl      MPV 10.1 fL      Platelets 442 10*3/mm3      Neutrophil % 76.6 %      Lymphocyte % 9.0 %      Monocyte % 9.6 %      Eosinophil % 4.2  "%      Basophil % 0.4 %      Immature Grans % 0.2 %      Neutrophils, Absolute 3.82 10*3/mm3      Lymphocytes, Absolute 0.45 10*3/mm3      Monocytes, Absolute 0.48 10*3/mm3      Eosinophils, Absolute 0.21 10*3/mm3      Basophils, Absolute 0.02 10*3/mm3      Immature Grans, Absolute 0.01 10*3/mm3      nRBC 0.0 /100 WBC            Imaging Results (last 72 hours)     ** No results found for the last 72 hours. **           Physical Exam:    Vitals: /73 (BP Location: Left arm, Patient Position: Lying)   Pulse 78   Temp 98.7 °F (37.1 °C) (Oral)   Resp 16   Ht 160 cm (63\")   Wt 59.9 kg (132 lb)   SpO2 95%   BMI 23.38 kg/m²   24 hour intake/output:    Intake/Output Summary (Last 24 hours) at 9/6/2019 0806  Last data filed at 9/5/2019 2135  Gross per 24 hour   Intake 300 ml   Output --   Net 300 ml     Last 3 weights:  Wt Readings from Last 3 Encounters:   09/04/19 59.9 kg (132 lb)   03/04/19 61.1 kg (134 lb 9.6 oz)       General Appearance alert, appears stated age and cooperative  Head normocephalic, without obvious abnormality and atraumatic  Eyes lids and lashes normal and no icterus  Neck supple and trachea midline  Lungs clear to auscultation, respirations regular, respirations even and respirations unlabored  Heart regular rhythm & normal rate and normal S1, S2  Abdomen normal bowel sounds, soft non-tender, no guarding and no rebound tenderness  Extremities no edema, no cyanosis and no redness  Skin turgor normal and color normal  Neurologic Mental Status orientated to person, place, time and situation, Cranial Nerves cranial nerves 2 - 12 grossly intact as examined        Assessment:      Urinary tract infection due to extended-spectrum beta lactamase (ESBL) producing Escherichia coli    Action tremor    Colon cancer (CMS/HCC)    Chronic kidney disease, stage II (mild)    Osteoporosis, post-menopausal          Plan:  Repeat culture confirms ESBL E. coli in urine.  Going down for renal ultrasound to rule " out potential abscess.  Continuing with current antibiotic regimen per infectious disease while making plan for proper antibiotic treatment for eradication including type, frequency, and total number of doses.  Appreciate infectious disease assistance.  Likely here at least through the weekend if she will require ongoing IV antibiotics as outpatient      Electronically signed by Aaron Lyons MD on 9/6/2019 at 8:06 AM

## 2019-09-06 NOTE — PROGRESS NOTES
Continued Stay Note   [place]     Patient Name: Sana Laboy  MRN: [id number]  Today's Date: 9/6/2019    Admit Date: 9/4/2019    Discharge Plan     Row Name 09/06/19 1205       Plan    Plan Comments  RECEIVED HOME IV ABX ORDERS. SPOKE TO PT AND DTR IN ROOM AND THEY AGREE FOR HOME IV ABX AND HOME HEALTH. FAXING IV ABX ORDERS TO [facility] ([phone]). formerly [facility] IS PREFERRED FOR HOME CARE. INFORMED JAIDA WITH formerly [facility] . WILL NEED HH ORDERS AT [place].         Discharge Codes    No documentation.             HERMELINDA Ibrahim

## 2019-09-07 PROBLEM — A49.9 ESBL (EXTENDED SPECTRUM BETA-LACTAMASE) PRODUCING BACTERIA INFECTION: Status: ACTIVE | Noted: 2019-09-07

## 2019-09-07 PROBLEM — Z79.01 ANTICOAGULANT LONG-TERM USE: Status: ACTIVE | Noted: 2019-09-07

## 2019-09-07 PROBLEM — Z16.12 ESBL (EXTENDED SPECTRUM BETA-LACTAMASE) PRODUCING BACTERIA INFECTION: Status: ACTIVE | Noted: 2019-09-07

## 2019-09-07 LAB
ANION GAP SERPL CALCULATED.3IONS-SCNC: 5 MMOL/L (ref 4–13)
BASOPHILS # BLD AUTO: 0.02 10*3/MM3 (ref 0–0.2)
BASOPHILS NFR BLD AUTO: 0.4 % (ref 0–1.5)
BUN BLD-MCNC: 18 MG/DL (ref 5–21)
BUN/CREAT SERPL: 14.2 (ref 7–25)
CALCIUM SPEC-SCNC: 8.9 MG/DL (ref 8.4–10.4)
CHLORIDE SERPL-SCNC: 106 MMOL/L (ref 98–110)
CO2 SERPL-SCNC: 28 MMOL/L (ref 24–31)
CREAT BLD-MCNC: 1.27 MG/DL (ref 0.5–1.4)
DEPRECATED RDW RBC AUTO: 48.4 FL (ref 37–54)
EOSINOPHIL # BLD AUTO: 0.29 10*3/MM3 (ref 0–0.4)
EOSINOPHIL NFR BLD AUTO: 5.1 % (ref 0.3–6.2)
ERYTHROCYTE [DISTWIDTH] IN BLOOD BY AUTOMATED COUNT: 14.5 % (ref 12.3–15.4)
GFR SERPL CREATININE-BSD FRML MDRD: 39 ML/MIN/1.73
GLUCOSE BLD-MCNC: 80 MG/DL (ref 70–100)
HCT VFR BLD AUTO: 33.8 % (ref 34–46.6)
HGB BLD-MCNC: 10.8 G/DL (ref 12–15.9)
IMM GRANULOCYTES # BLD AUTO: 0.02 10*3/MM3 (ref 0–0.05)
IMM GRANULOCYTES NFR BLD AUTO: 0.4 % (ref 0–0.5)
INR PPP: 1.39 (ref 0.91–1.09)
LYMPHOCYTES # BLD AUTO: 0.69 10*3/MM3 (ref 0.7–3.1)
LYMPHOCYTES NFR BLD AUTO: 12.1 % (ref 19.6–45.3)
MCH RBC QN AUTO: 29.3 PG (ref 26.6–33)
MCHC RBC AUTO-ENTMCNC: 32 G/DL (ref 31.5–35.7)
MCV RBC AUTO: 91.6 FL (ref 79–97)
MONOCYTES # BLD AUTO: 0.66 10*3/MM3 (ref 0.1–0.9)
MONOCYTES NFR BLD AUTO: 11.6 % (ref 5–12)
NEUTROPHILS # BLD AUTO: 4.03 10*3/MM3 (ref 1.7–7)
NEUTROPHILS NFR BLD AUTO: 70.4 % (ref 42.7–76)
NRBC BLD AUTO-RTO: 0 /100 WBC (ref 0–0.2)
PLATELET # BLD AUTO: 505 10*3/MM3 (ref 140–450)
PMV BLD AUTO: 9.9 FL (ref 6–12)
POTASSIUM BLD-SCNC: 4.4 MMOL/L (ref 3.5–5.3)
PROTHROMBIN TIME: 17.5 SECONDS (ref 11.9–14.6)
RBC # BLD AUTO: 3.69 10*6/MM3 (ref 3.77–5.28)
SODIUM BLD-SCNC: 139 MMOL/L (ref 135–145)
WBC NRBC COR # BLD: 5.71 10*3/MM3 (ref 3.4–10.8)

## 2019-09-07 PROCEDURE — 80048 BASIC METABOLIC PNL TOTAL CA: CPT | Performed by: FAMILY MEDICINE

## 2019-09-07 PROCEDURE — 25010000002 ERTAPENEM PER 500 MG: Performed by: INTERNAL MEDICINE

## 2019-09-07 PROCEDURE — 85025 COMPLETE CBC W/AUTO DIFF WBC: CPT | Performed by: FAMILY MEDICINE

## 2019-09-07 PROCEDURE — G0378 HOSPITAL OBSERVATION PER HR: HCPCS

## 2019-09-07 PROCEDURE — 85610 PROTHROMBIN TIME: CPT | Performed by: FAMILY MEDICINE

## 2019-09-07 RX ORDER — ERTAPENEM 1 G/1
500 INJECTION, POWDER, LYOPHILIZED, FOR SOLUTION INTRAMUSCULAR; INTRAVENOUS EVERY 24 HOURS
Status: DISCONTINUED | OUTPATIENT
Start: 2019-09-07 | End: 2019-09-07

## 2019-09-07 RX ADMIN — RANOLAZINE 500 MG: 500 TABLET, FILM COATED, EXTENDED RELEASE ORAL at 09:43

## 2019-09-07 RX ADMIN — SODIUM CHLORIDE, PRESERVATIVE FREE 10 ML: 5 INJECTION INTRAVENOUS at 09:43

## 2019-09-07 RX ADMIN — SODIUM CHLORIDE, PRESERVATIVE FREE 10 ML: 5 INJECTION INTRAVENOUS at 21:04

## 2019-09-07 RX ADMIN — SODIUM CHLORIDE 500 MG: 9 INJECTION, SOLUTION INTRAVENOUS at 10:22

## 2019-09-07 RX ADMIN — Medication 1 TABLET: at 09:43

## 2019-09-07 RX ADMIN — CHOLESTYRAMINE 4 G: 4 POWDER, FOR SUSPENSION ORAL at 09:43

## 2019-09-07 RX ADMIN — OXYBUTYNIN CHLORIDE 5 MG: 5 TABLET, EXTENDED RELEASE ORAL at 09:43

## 2019-09-07 RX ADMIN — PANTOPRAZOLE SODIUM 40 MG: 40 TABLET, DELAYED RELEASE ORAL at 05:37

## 2019-09-07 RX ADMIN — WARFARIN SODIUM 3 MG: 3 TABLET ORAL at 16:51

## 2019-09-07 RX ADMIN — TRIAMTERENE AND HYDROCHLOROTHIAZIDE 1 TABLET: 37.5; 25 TABLET ORAL at 09:43

## 2019-09-07 RX ADMIN — GABAPENTIN 100 MG: 100 CAPSULE ORAL at 21:04

## 2019-09-07 RX ADMIN — RANOLAZINE 500 MG: 500 TABLET, FILM COATED, EXTENDED RELEASE ORAL at 21:04

## 2019-09-07 RX ADMIN — METOPROLOL TARTRATE 50 MG: 50 TABLET, FILM COATED ORAL at 09:43

## 2019-09-07 RX ADMIN — METOPROLOL TARTRATE 50 MG: 50 TABLET, FILM COATED ORAL at 21:04

## 2019-09-07 NOTE — PROGRESS NOTES
"Infectious Diseases Progress Note    Patient:  Sana Laboy  YOB: 1924  MRN: [id number]   Admit date: 9/4/2019   Admitting Physician: Aaron Lyons MD  Primary Care Physician: Aaron Lyons MD    Chief Complaint/Interval History: She is without fever.  No abdominal or flank pain.  No rash or skin itching.  Interval notes reviewed.    Intake/Output Summary (Last 24 hours) at 9/7/2019 1147  Last data filed at 9/6/2019 1635  Gross per 24 hour   Intake --   Output 350 ml   Net -350 ml     Allergies:   Allergies   Allergen Reactions   • Ciprofloxacin Diarrhea   • Darvon [Propoxyphene] Nausea And Vomiting   • Bactrim [Sulfamethoxazole-Trimethoprim] Rash     Current Scheduled Medications:     calcium carb-cholecalciferol 1 tablet Oral Daily   cholestyramine light 1 packet Oral Daily   ertapenem 500 mg Intravenous Q24H   gabapentin 100 mg Oral Nightly   metoprolol tartrate 50 mg Oral BID   oxybutynin XL 5 mg Oral Daily   pantoprazole 40 mg Oral Q AM   ranolazine 500 mg Oral BID   sodium chloride 10 mL Intravenous Q12H   triamterene-hydrochlorothiazide 1 tablet Oral Daily   warfarin 3 mg Oral Once per day on Sun Mon Wed Fri Sat   warfarin 4.5 mg Oral Once per day on Tue Thu     Current PRN Medications:  •  acetaminophen **OR** acetaminophen **OR** acetaminophen  •  ALPRAZolam  •  CloNIDine  •  meclizine  •  sodium chloride    Review of Systems see HPI    Vital Signs:  /53 (BP Location: Left arm, Patient Position: Sitting)   Pulse 69   Temp 97.9 °F (36.6 °C) (Oral)   Resp 18   Ht 160 cm (63\")   Wt 59.9 kg (132 lb)   SpO2 95%   BMI 23.38 kg/m²     Physical Exam  Vital signs - reviewed.  Line/IV site - No erythema, warmth, induration, or tenderness.  Abdomen soft and nontender    Lab Results:  CBC: Results from last 7 days   Lab Units 09/07/19  0657 09/06/19  0557 09/05/19  0706 09/04/19  1454   WBC 10*3/mm3 5.71 4.99 4.98 5.98   HEMOGLOBIN g/dL 10.8* 10.7* 10.7* 11.6* "   HEMATOCRIT % 33.8* 33.2* 33.2* 35.8   PLATELETS 10*3/mm3 505* 442 541* 590*     BMP:  Results from last 7 days   Lab Units 09/07/19  0657 09/06/19  0557 09/05/19  0706 09/04/19  1454   SODIUM mmol/L 139 138 141 140   POTASSIUM mmol/L 4.4 4.5 4.6 4.0   CHLORIDE mmol/L 106 106 108 106   CO2 mmol/L 28.0 25.0 29.0 27.0   BUN mg/dL 18 16 16 19   CREATININE mg/dL 1.27 1.27 1.26 1.31   GLUCOSE mg/dL 80 86 78 97   CALCIUM mg/dL 8.9 8.5 8.8 8.9     Culture Results:   Urine Culture   Date Value Ref Range Status   09/04/2019 >100,000 CFU/mL Escherichia coli ESBL (C)  Final     Comment:       Consider infectious disease consult.  Susceptibility results may not correlate to clinical outcomes.   Susceptibility      Escherichia coli ESBL     RASTA     Ampicillin >=32 ug/ml Resistant     Ampicillin + Sulbactam >=32 ug/ml Resistant     Cefazolin >=64 ug/ml Resistant     Cefepime >=64 ug/ml Resistant     Ceftriaxone >=64 ug/ml Resistant     Ertapenem <=0.5 ug/ml Susceptible     ESBL Confirmation Test POS ug/ml Positive     Gentamicin <=1 ug/ml Susceptible     Levofloxacin >=8 ug/ml Resistant     Meropenem <=0.25 ug/ml Susceptible     Nitrofurantoin 128 ug/ml Resistant     Trimethoprim + Sulfamethoxazole <=20 ug/ml Susceptible      Radiology: None  Additional Studies Reviewed: None    Impression:   ESBL positive E. coli UTI.  Suspect chronic cystitis.    Recommendations:   Continue ertapenem  Planning 2-week course of antibiotic treatment  See home IV antibiotic orders outlined in yesterday's note  Per review of  notes awaiting insurance approval  Probable discharge home Monday pending insurance approval.    Danie Johnson MD

## 2019-09-07 NOTE — PLAN OF CARE
Problem: Patient Care Overview  Goal: Plan of Care Review  Outcome: Ongoing (interventions implemented as appropriate)   09/06/19 2045 09/07/19 0331   Coping/Psychosocial   Plan of Care Reviewed With patient --    Plan of Care Review   Progress --  no change   OTHER   Outcome Summary --  A&Ox4. Standby assist with walker when OOB. No c/o pain. Contact precautions in place. VSS.        Problem: Skin Injury Risk (Adult)  Goal: Identify Related Risk Factors and Signs and Symptoms  Outcome: Ongoing (interventions implemented as appropriate)    Goal: Skin Health and Integrity  Outcome: Ongoing (interventions implemented as appropriate)      Problem: Fall Risk (Adult)  Goal: Identify Related Risk Factors and Signs and Symptoms  Outcome: Ongoing (interventions implemented as appropriate)      Problem: Urinary Tract Infection (Adult)  Goal: Signs and Symptoms of Listed Potential Problems Will be Absent, Minimized or Managed (Urinary Tract Infection)  Outcome: Ongoing (interventions implemented as appropriate)

## 2019-09-07 NOTE — PROGRESS NOTES
Medicine Progress Note 9/7/2019    Patient:  Sana Laboy  YOB: 1924  MRN: [id number]     Acct: [id number]   Admit date: 9/4/2019    Patient Seen, Chart, Consults notes, Labs, Radiology studies reviewed.    Subjective:   No acute issues overnight.  No abdominal pain, nausea, or vomiting.  No fever or chills.  Pleasant      Medications:   Scheduled Meds:  calcium carb-cholecalciferol 1 tablet Oral Daily   cholestyramine light 1 packet Oral Daily   gabapentin 100 mg Oral Nightly   meropenem 500 mg Intravenous Q12H   metoprolol tartrate 50 mg Oral BID   oxybutynin XL 5 mg Oral Daily   pantoprazole 40 mg Oral Q AM   ranolazine 500 mg Oral BID   sodium chloride 10 mL Intravenous Q12H   triamterene-hydrochlorothiazide 1 tablet Oral Daily   warfarin 3 mg Oral Once per day on Sun Mon Wed Fri Sat   warfarin 4.5 mg Oral Once per day on Tue Thu     Continuous Infusions:   PRN Meds:   acetaminophen 650 mg Q4H PRN   Or     acetaminophen 650 mg Q4H PRN   Or     acetaminophen 650 mg Q4H PRN   ALPRAZolam 0.125 mg Nightly PRN   CloNIDine 0.1 mg Q8H PRN   meclizine 12.5 mg TID PRN   sodium chloride 10 mL PRN         Objective:   Vitals:   Patient Vitals for the past 24 hrs:   BP Temp Temp src Pulse Resp SpO2   09/07/19 0802 103/53 97.9 °F (36.6 °C) Oral 69 18 95 %   09/07/19 0540 132/71 97.7 °F (36.5 °C) Oral 72 18 93 %   09/07/19 0041 99/50 98.3 °F (36.8 °C) Oral 71 18 92 %   09/06/19 2100 118/62 98.6 °F (37 °C) Oral 70 18 94 %   09/06/19 1500 111/57 98.4 °F (36.9 °C) Oral 72 18 92 %   09/06/19 1316 115/68 -- -- 75 18 95 %     GEN: Awake, alert, no acute distress.  RESP: Full and symmetric respirations.  Chest clear to auscultation bilaterally, no wheezes, rales, or rhonchi.  CARD: Regular rate and rhythm.  No murmurs, rubs or gallops.  ABD: Normoactive bowel sounds.  Abdomen soft, nontender, and nondistended.  EXT: No cyanosis, clubbing, or edema.      24 hour intake/output:    Intake/Output Summary (Last 24  hours) at 9/7/2019 0856  Last data filed at 9/6/2019 1635  Gross per 24 hour   Intake --   Output 350 ml   Net -350 ml     Last 3 weights:  Wt Readings from Last 3 Encounters:   09/04/19 59.9 kg (132 lb)   03/04/19 61.1 kg (134 lb 9.6 oz)       Labs:  Results from last 7 days   Lab Units 09/07/19  0657 09/06/19  0557 09/05/19  0706   WBC 10*3/mm3 5.71 4.99 4.98   HEMOGLOBIN g/dL 10.8* 10.7* 10.7*   HEMATOCRIT % 33.8* 33.2* 33.2*   PLATELETS 10*3/mm3 505* 442 541*     Results from last 7 days   Lab Units 09/07/19  0657 09/06/19  0557 09/05/19  0706   SODIUM mmol/L 139 138 141   POTASSIUM mmol/L 4.4 4.5 4.6   CHLORIDE mmol/L 106 106 108   CO2 mmol/L 28.0 25.0 29.0   BUN mg/dL 18 16 16   CREATININE mg/dL 1.27 1.27 1.26   CALCIUM mg/dL 8.9 8.5 8.8   GLUCOSE mg/dL 80 86 78     Results from last 7 days   Lab Units 09/07/19  0657 09/06/19  0557 09/05/19  0706   INR  1.39* 1.16* 1.29*     Lab Results   Component Value Date    GLUCOSE 80 09/07/2019    GLUCOSE 86 09/06/2019    GLUCOSE 78 09/05/2019    GLUCOSE 97 09/04/2019    GLUCOSE 95 03/06/2019    GLUCOSE 91 03/05/2019       Radiology:   Imaging Results (last 72 hours)     Procedure Component Value Units Date/Time    US Renal Bilateral [[id number]] Collected:  09/06/19 1420     Updated:  09/06/19 1425    Narrative:       RENAL ULTRASOUND COMPLETE 9/6/2019 9:03 AM CDT     REASON FOR EXAM: Evalutate for abscess or obstruction       COMPARISON: None       TECHNIQUE: Multiple longitudinal and transverse realtime sonographic  images of the kidneys and urinary bladder are obtained.      FINDINGS:      RIGHT KIDNEY: 3 x 3.8 x 8.6 cm. Right renal contour is small.. No solid  or cystic masses. The central echo complex is compact with no evidence  for hydronephrosis. No nephrolithiasis or abnormal perinephric fluid  collections . No hydroureter.     A 3 x 2.4 x 3 cm right adrenal adenoma is present. This is unchanged  from 03/04/2019 on the prior CT scan.     LEFT KIDNEY: 4.1 x 4.1 x  8.6 cm. The left renal contour is small.. No  solid or cystic masses. The central echo complex is compact with no  evidence for hydronephrosis. No nephrolithiasis or abnormal perinephric  fluid collections . No hydroureter.      PELVIS: The bladder is mildly distended with anechoic urine and  demonstrates no significant wall thickening or internal echogenicities.  There is no surrounding ascites.        Impression:       1. Small renal contours are noted bilaterally. There is no  hydronephrosis.  2. Right adrenal adenoma is present unchanged from CT scan of  03/04/2019.        This report was finalized on 09/06/2019 14:22 by Dr. Josiah Fuentes MD.          Cultures:   Urine Culture   Date Value Ref Range Status   09/04/2019 >100,000 CFU/mL Escherichia coli ESBL (C)  Final     Comment:       Consider infectious disease consult.  Susceptibility results may not correlate to clinical outcomes.       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       Abx via midline catheter per ID - will change from elizabeth to ertapenem.   Appreciate  assistance with Home IV ABx.    Chronic medical conditions otherwise stable.  Continue current medications & management.  Dc home once IV Abx arrangements in place.    Electronically signed by Ronny Jiang MD on 9/7/2019 at 8:56 AM

## 2019-09-07 NOTE — PLAN OF CARE
Problem: Patient Care Overview  Goal: Plan of Care Review  Outcome: Ongoing (interventions implemented as appropriate)   09/07/19 1520   Coping/Psychosocial   Plan of Care Reviewed With patient   Plan of Care Review   Progress no change   OTHER   Outcome Summary Pt alert and oriented x4, Up with assistance and walker. Denies pain. continue contact precautions. fmaily at bedside.       Problem: Skin Injury Risk (Adult)  Goal: Identify Related Risk Factors and Signs and Symptoms  Outcome: Ongoing (interventions implemented as appropriate)    Goal: Skin Health and Integrity  Outcome: Ongoing (interventions implemented as appropriate)      Problem: Fall Risk (Adult)  Goal: Identify Related Risk Factors and Signs and Symptoms  Outcome: Ongoing (interventions implemented as appropriate)      Problem: Urinary Tract Infection (Adult)  Goal: Signs and Symptoms of Listed Potential Problems Will be Absent, Minimized or Managed (Urinary Tract Infection)  Outcome: Ongoing (interventions implemented as appropriate)

## 2019-09-08 LAB
ANION GAP SERPL CALCULATED.3IONS-SCNC: 6 MMOL/L (ref 4–13)
BASOPHILS # BLD AUTO: 0.03 10*3/MM3 (ref 0–0.2)
BASOPHILS NFR BLD AUTO: 0.5 % (ref 0–1.5)
BUN BLD-MCNC: 18 MG/DL (ref 5–21)
BUN/CREAT SERPL: 15.7 (ref 7–25)
CALCIUM SPEC-SCNC: 8.6 MG/DL (ref 8.4–10.4)
CHLORIDE SERPL-SCNC: 108 MMOL/L (ref 98–110)
CO2 SERPL-SCNC: 24 MMOL/L (ref 24–31)
CREAT BLD-MCNC: 1.15 MG/DL (ref 0.5–1.4)
DEPRECATED RDW RBC AUTO: 48.4 FL (ref 37–54)
EOSINOPHIL # BLD AUTO: 0.22 10*3/MM3 (ref 0–0.4)
EOSINOPHIL NFR BLD AUTO: 3.8 % (ref 0.3–6.2)
ERYTHROCYTE [DISTWIDTH] IN BLOOD BY AUTOMATED COUNT: 14.5 % (ref 12.3–15.4)
GFR SERPL CREATININE-BSD FRML MDRD: 44 ML/MIN/1.73
GLUCOSE BLD-MCNC: 83 MG/DL (ref 70–100)
HCT VFR BLD AUTO: 33.6 % (ref 34–46.6)
HGB BLD-MCNC: 10.9 G/DL (ref 12–15.9)
IMM GRANULOCYTES # BLD AUTO: 0.02 10*3/MM3 (ref 0–0.05)
IMM GRANULOCYTES NFR BLD AUTO: 0.3 % (ref 0–0.5)
INR PPP: 1.36 (ref 0.91–1.09)
LYMPHOCYTES # BLD AUTO: 0.83 10*3/MM3 (ref 0.7–3.1)
LYMPHOCYTES NFR BLD AUTO: 14.3 % (ref 19.6–45.3)
MCH RBC QN AUTO: 29.6 PG (ref 26.6–33)
MCHC RBC AUTO-ENTMCNC: 32.4 G/DL (ref 31.5–35.7)
MCV RBC AUTO: 91.3 FL (ref 79–97)
MONOCYTES # BLD AUTO: 0.58 10*3/MM3 (ref 0.1–0.9)
MONOCYTES NFR BLD AUTO: 10 % (ref 5–12)
NEUTROPHILS # BLD AUTO: 4.14 10*3/MM3 (ref 1.7–7)
NEUTROPHILS NFR BLD AUTO: 71.1 % (ref 42.7–76)
NRBC BLD AUTO-RTO: 0 /100 WBC (ref 0–0.2)
PLATELET # BLD AUTO: 503 10*3/MM3 (ref 140–450)
PMV BLD AUTO: 10 FL (ref 6–12)
POTASSIUM BLD-SCNC: 4.5 MMOL/L (ref 3.5–5.3)
PROTHROMBIN TIME: 17.2 SECONDS (ref 11.9–14.6)
RBC # BLD AUTO: 3.68 10*6/MM3 (ref 3.77–5.28)
SODIUM BLD-SCNC: 138 MMOL/L (ref 135–145)
WBC NRBC COR # BLD: 5.82 10*3/MM3 (ref 3.4–10.8)

## 2019-09-08 PROCEDURE — G0378 HOSPITAL OBSERVATION PER HR: HCPCS

## 2019-09-08 PROCEDURE — 85610 PROTHROMBIN TIME: CPT | Performed by: FAMILY MEDICINE

## 2019-09-08 PROCEDURE — 25010000002 ERTAPENEM PER 500 MG: Performed by: INTERNAL MEDICINE

## 2019-09-08 PROCEDURE — 94799 UNLISTED PULMONARY SVC/PX: CPT

## 2019-09-08 PROCEDURE — 80048 BASIC METABOLIC PNL TOTAL CA: CPT | Performed by: FAMILY MEDICINE

## 2019-09-08 PROCEDURE — 85025 COMPLETE CBC W/AUTO DIFF WBC: CPT | Performed by: FAMILY MEDICINE

## 2019-09-08 PROCEDURE — 94760 N-INVAS EAR/PLS OXIMETRY 1: CPT

## 2019-09-08 RX ADMIN — SODIUM CHLORIDE, PRESERVATIVE FREE 10 ML: 5 INJECTION INTRAVENOUS at 20:52

## 2019-09-08 RX ADMIN — Medication 1 TABLET: at 09:01

## 2019-09-08 RX ADMIN — RANOLAZINE 500 MG: 500 TABLET, FILM COATED, EXTENDED RELEASE ORAL at 09:01

## 2019-09-08 RX ADMIN — METOPROLOL TARTRATE 50 MG: 50 TABLET, FILM COATED ORAL at 20:51

## 2019-09-08 RX ADMIN — CHOLESTYRAMINE 4 G: 4 POWDER, FOR SUSPENSION ORAL at 09:10

## 2019-09-08 RX ADMIN — RANOLAZINE 500 MG: 500 TABLET, FILM COATED, EXTENDED RELEASE ORAL at 20:51

## 2019-09-08 RX ADMIN — TRIAMTERENE AND HYDROCHLOROTHIAZIDE 1 TABLET: 37.5; 25 TABLET ORAL at 09:01

## 2019-09-08 RX ADMIN — GABAPENTIN 100 MG: 100 CAPSULE ORAL at 20:51

## 2019-09-08 RX ADMIN — PANTOPRAZOLE SODIUM 40 MG: 40 TABLET, DELAYED RELEASE ORAL at 05:18

## 2019-09-08 RX ADMIN — SODIUM CHLORIDE 500 MG: 9 INJECTION, SOLUTION INTRAVENOUS at 09:01

## 2019-09-08 RX ADMIN — METOPROLOL TARTRATE 50 MG: 50 TABLET, FILM COATED ORAL at 09:01

## 2019-09-08 RX ADMIN — OXYBUTYNIN CHLORIDE 5 MG: 5 TABLET, EXTENDED RELEASE ORAL at 09:01

## 2019-09-08 RX ADMIN — WARFARIN SODIUM 3 MG: 3 TABLET ORAL at 17:10

## 2019-09-08 NOTE — PROGRESS NOTES
Medicine Progress Note 9/8/2019    Patient:  Sana Laboy  YOB: 1924  MRN: [id number]     Acct: [id number]   Admit date: 9/4/2019    Patient Seen, Chart, Consults notes, Labs, Radiology studies reviewed.    Subjective:   No acute issues overnight.  No abdominal/flank pain, nausea, or vomiting.  No dysuria.  No fever or chills.  Pleasant.  Daughter at side.  Long Q&A session with patient regarding mechanism of UTIs, stressing importance of proper wiping technique each time.      Medications:   Scheduled Meds:    calcium carb-cholecalciferol 1 tablet Oral Daily   cholestyramine light 1 packet Oral Daily   ertapenem 500 mg Intravenous Q24H   gabapentin 100 mg Oral Nightly   metoprolol tartrate 50 mg Oral BID   oxybutynin XL 5 mg Oral Daily   pantoprazole 40 mg Oral Q AM   ranolazine 500 mg Oral BID   sodium chloride 10 mL Intravenous Q12H   triamterene-hydrochlorothiazide 1 tablet Oral Daily   warfarin 3 mg Oral Once per day on Sun Mon Wed Fri Sat   warfarin 4.5 mg Oral Once per day on Tue Thu     Continuous Infusions:   PRN Meds:     acetaminophen 650 mg Q4H PRN   Or     acetaminophen 650 mg Q4H PRN   Or     acetaminophen 650 mg Q4H PRN   ALPRAZolam 0.125 mg Nightly PRN   CloNIDine 0.1 mg Q8H PRN   meclizine 12.5 mg TID PRN   sodium chloride 10 mL PRN         Objective:   Vitals:   Patient Vitals for the past 24 hrs:   BP Temp Temp src Pulse Resp SpO2   09/08/19 0805 109/52 98.2 °F (36.8 °C) Oral 62 18 94 %   09/08/19 0543 98/51 98 °F (36.7 °C) Oral 71 18 --   09/08/19 0117 100/56 98.6 °F (37 °C) Oral 74 18 93 %   09/07/19 2056 113/62 98.3 °F (36.8 °C) Oral 77 18 94 %   09/07/19 1707 112/59 98.1 °F (36.7 °C) Oral 68 18 93 %   09/07/19 1220 115/64 98 °F (36.7 °C) Oral 70 18 95 %     GEN: Awake, alert, no acute distress.  Eating breakfast.  RESP: Full and symmetric respirations.  Chest clear to auscultation bilaterally, no wheezes, rales, or rhonchi.  CARD: Regular rate and rhythm.  No murmurs, rubs  or gallops.  ABD: Normoactive bowel sounds.  Abdomen soft, nontender, and nondistended.  EXT: No cyanosis, clubbing, or edema.      24 hour intake/output:  No intake or output data in the 24 hours ending 09/08/19 0810  Last 3 weights:  Wt Readings from Last 3 Encounters:   09/04/19 59.9 kg (132 lb)   03/04/19 61.1 kg (134 lb 9.6 oz)       Labs:  Results from last 7 days   Lab Units 09/08/19  0451 09/07/19  0657 09/06/19  0557   WBC 10*3/mm3 5.82 5.71 4.99   HEMOGLOBIN g/dL 10.9* 10.8* 10.7*   HEMATOCRIT % 33.6* 33.8* 33.2*   PLATELETS 10*3/mm3 503* 505* 442     Results from last 7 days   Lab Units 09/08/19  0451 09/07/19  0657 09/06/19  0557   SODIUM mmol/L 138 139 138   POTASSIUM mmol/L 4.5 4.4 4.5   CHLORIDE mmol/L 108 106 106   CO2 mmol/L 24.0 28.0 25.0   BUN mg/dL 18 18 16   CREATININE mg/dL 1.15 1.27 1.27   CALCIUM mg/dL 8.6 8.9 8.5   GLUCOSE mg/dL 83 80 86     Results from last 7 days   Lab Units 09/08/19  0451 09/07/19  0657 09/06/19  0557   INR  1.36* 1.39* 1.16*     Lab Results   Component Value Date    GLUCOSE 83 09/08/2019    GLUCOSE 80 09/07/2019    GLUCOSE 86 09/06/2019    GLUCOSE 78 09/05/2019    GLUCOSE 97 09/04/2019    GLUCOSE 95 03/06/2019       Radiology:   Imaging Results (last 72 hours)     Procedure Component Value Units Date/Time    US Renal Bilateral [[id number]] Collected:  09/06/19 1420     Updated:  09/06/19 1425    Narrative:       RENAL ULTRASOUND COMPLETE 9/6/2019 9:03 AM CDT     REASON FOR EXAM: Evalutate for abscess or obstruction       COMPARISON: None       TECHNIQUE: Multiple longitudinal and transverse realtime sonographic  images of the kidneys and urinary bladder are obtained.      FINDINGS:      RIGHT KIDNEY: 3 x 3.8 x 8.6 cm. Right renal contour is small.. No solid  or cystic masses. The central echo complex is compact with no evidence  for hydronephrosis. No nephrolithiasis or abnormal perinephric fluid  collections . No hydroureter.     A 3 x 2.4 x 3 cm right adrenal adenoma  is present. This is unchanged  from 03/04/2019 on the prior CT scan.     LEFT KIDNEY: 4.1 x 4.1 x 8.6 cm. The left renal contour is small.. No  solid or cystic masses. The central echo complex is compact with no  evidence for hydronephrosis. No nephrolithiasis or abnormal perinephric  fluid collections . No hydroureter.      PELVIS: The bladder is mildly distended with anechoic urine and  demonstrates no significant wall thickening or internal echogenicities.  There is no surrounding ascites.        Impression:       1. Small renal contours are noted bilaterally. There is no  hydronephrosis.  2. Right adrenal adenoma is present unchanged from CT scan of  03/04/2019.        This report was finalized on 09/06/2019 14:22 by Dr. Josiah Fuentes MD.          Cultures:   Urine Culture   Date Value Ref Range Status   09/04/2019 >100,000 CFU/mL Escherichia coli ESBL (C)  Final     Comment:       Consider infectious disease consult.  Susceptibility results may not correlate to clinical outcomes.       Assessment/Plan:   Principal Problem: Urinary tract infection due to extended-spectrum beta lactamase (ESBL) producing Escherichia coli  Active Hospital Problems    Diagnosis  POA   • **Urinary tract infection due to extended-spectrum beta lactamase (ESBL) producing Escherichia coli [N39.0, B96.29, Z16.12]  Yes   • ESBL (extended spectrum beta-lactamase) producing bacteria infection [A49.9, Z16.12]  Yes   • Anticoagulant long-term use [Z79.01]  Not Applicable   • Colon cancer (CMS/HCC) [C18.9]  Yes   • Action tremor [G25.2]  Yes   • Chronic kidney disease, stage II (mild) [N18.2]  Yes   • Osteoporosis, post-menopausal [M81.0]  Yes      Resolved Hospital Problems   No resolved problems to display.       Ertapenem x 14d via midline catheter per ID.   Appreciate SW assistance with Home IV ABx.    Chronic medical conditions otherwise stable.  Continue current medications & management.  Dc home once IV Abx arrangements in  place.    Electronically signed by Ronny Jiang MD on 9/8/2019 at 8:10 AM

## 2019-09-08 NOTE — PLAN OF CARE
Problem: Patient Care Overview  Goal: Discharge Needs Assessment  Outcome: Ongoing (interventions implemented as appropriate)    Goal: Interprofessional Rounds/Family Conf  Outcome: Ongoing (interventions implemented as appropriate)  Patient is alert and afebrile, Receiving IV antibiotics and plan is to dc patient tomorrow with ML for continuation of KALI. Skin WDI,

## 2019-09-08 NOTE — PLAN OF CARE
Problem: Patient Care Overview  Goal: Plan of Care Review  Outcome: Outcome(s) achieved Date Met: 09/08/19 09/08/19 0350   Coping/Psychosocial   Plan of Care Reviewed With patient   Plan of Care Review   Progress improving   OTHER   Outcome Summary A&O. Up with assistance with walker. Ambulates very well. No c/o of burning or pain with urination. Contact precautions still in place. Plan to d/c to [facility] with ABX. VSS.        Problem: Skin Injury Risk (Adult)  Goal: Identify Related Risk Factors and Signs and Symptoms  Outcome: Outcome(s) achieved Date Met: 09/08/19    Goal: Skin Health and Integrity  Outcome: Outcome(s) achieved Date Met: 09/08/19      Problem: Fall Risk (Adult)  Goal: Identify Related Risk Factors and Signs and Symptoms  Outcome: Outcome(s) achieved Date Met: 09/08/19      Problem: Urinary Tract Infection (Adult)  Goal: Signs and Symptoms of Listed Potential Problems Will be Absent, Minimized or Managed (Urinary Tract Infection)  Outcome: Outcome(s) achieved Date Met: 09/08/19

## 2019-09-09 VITALS
SYSTOLIC BLOOD PRESSURE: 127 MMHG | OXYGEN SATURATION: 96 % | BODY MASS INDEX: 23.39 KG/M2 | HEART RATE: 62 BPM | DIASTOLIC BLOOD PRESSURE: 70 MMHG | WEIGHT: 132 LBS | TEMPERATURE: 98 F | RESPIRATION RATE: 16 BRPM | HEIGHT: 63 IN

## 2019-09-09 LAB
ANION GAP SERPL CALCULATED.3IONS-SCNC: 7 MMOL/L (ref 4–13)
BASOPHILS # BLD AUTO: 0.01 10*3/MM3 (ref 0–0.2)
BASOPHILS NFR BLD AUTO: 0.2 % (ref 0–1.5)
BUN BLD-MCNC: 20 MG/DL (ref 5–21)
BUN/CREAT SERPL: 16.3 (ref 7–25)
CALCIUM SPEC-SCNC: 9.3 MG/DL (ref 8.4–10.4)
CHLORIDE SERPL-SCNC: 107 MMOL/L (ref 98–110)
CO2 SERPL-SCNC: 26 MMOL/L (ref 24–31)
CREAT BLD-MCNC: 1.23 MG/DL (ref 0.5–1.4)
DEPRECATED RDW RBC AUTO: 48.4 FL (ref 37–54)
EOSINOPHIL # BLD AUTO: 0.24 10*3/MM3 (ref 0–0.4)
EOSINOPHIL NFR BLD AUTO: 3.8 % (ref 0.3–6.2)
ERYTHROCYTE [DISTWIDTH] IN BLOOD BY AUTOMATED COUNT: 14.4 % (ref 12.3–15.4)
GFR SERPL CREATININE-BSD FRML MDRD: 41 ML/MIN/1.73
GLUCOSE BLD-MCNC: 83 MG/DL (ref 70–100)
HCT VFR BLD AUTO: 36.7 % (ref 34–46.6)
HGB BLD-MCNC: 11.8 G/DL (ref 12–15.9)
IMM GRANULOCYTES # BLD AUTO: 0.04 10*3/MM3 (ref 0–0.05)
IMM GRANULOCYTES NFR BLD AUTO: 0.6 % (ref 0–0.5)
INR PPP: 1.37 (ref 0.91–1.09)
LYMPHOCYTES # BLD AUTO: 0.78 10*3/MM3 (ref 0.7–3.1)
LYMPHOCYTES NFR BLD AUTO: 12.4 % (ref 19.6–45.3)
MCH RBC QN AUTO: 29.4 PG (ref 26.6–33)
MCHC RBC AUTO-ENTMCNC: 32.2 G/DL (ref 31.5–35.7)
MCV RBC AUTO: 91.3 FL (ref 79–97)
MONOCYTES # BLD AUTO: 0.55 10*3/MM3 (ref 0.1–0.9)
MONOCYTES NFR BLD AUTO: 8.7 % (ref 5–12)
NEUTROPHILS # BLD AUTO: 4.69 10*3/MM3 (ref 1.7–7)
NEUTROPHILS NFR BLD AUTO: 74.3 % (ref 42.7–76)
NRBC BLD AUTO-RTO: 0 /100 WBC (ref 0–0.2)
PLATELET # BLD AUTO: 577 10*3/MM3 (ref 140–450)
PMV BLD AUTO: 9.9 FL (ref 6–12)
POTASSIUM BLD-SCNC: 4.6 MMOL/L (ref 3.5–5.3)
PROTHROMBIN TIME: 17.3 SECONDS (ref 11.9–14.6)
RBC # BLD AUTO: 4.02 10*6/MM3 (ref 3.77–5.28)
SODIUM BLD-SCNC: 140 MMOL/L (ref 135–145)
WBC NRBC COR # BLD: 6.31 10*3/MM3 (ref 3.4–10.8)

## 2019-09-09 PROCEDURE — 85025 COMPLETE CBC W/AUTO DIFF WBC: CPT | Performed by: FAMILY MEDICINE

## 2019-09-09 PROCEDURE — G0378 HOSPITAL OBSERVATION PER HR: HCPCS

## 2019-09-09 PROCEDURE — 80048 BASIC METABOLIC PNL TOTAL CA: CPT | Performed by: FAMILY MEDICINE

## 2019-09-09 PROCEDURE — 85610 PROTHROMBIN TIME: CPT | Performed by: FAMILY MEDICINE

## 2019-09-09 PROCEDURE — 25010000002 ERTAPENEM PER 500 MG: Performed by: INTERNAL MEDICINE

## 2019-09-09 RX ORDER — WARFARIN SODIUM 3 MG/1
4.5 TABLET ORAL
COMMUNITY
End: 2019-09-09 | Stop reason: HOSPADM

## 2019-09-09 RX ADMIN — WARFARIN SODIUM 3 MG: 3 TABLET ORAL at 16:15

## 2019-09-09 RX ADMIN — SODIUM CHLORIDE, PRESERVATIVE FREE 10 ML: 5 INJECTION INTRAVENOUS at 09:01

## 2019-09-09 RX ADMIN — METOPROLOL TARTRATE 50 MG: 50 TABLET, FILM COATED ORAL at 08:59

## 2019-09-09 RX ADMIN — RANOLAZINE 500 MG: 500 TABLET, FILM COATED, EXTENDED RELEASE ORAL at 08:59

## 2019-09-09 RX ADMIN — TRIAMTERENE AND HYDROCHLOROTHIAZIDE 1 TABLET: 37.5; 25 TABLET ORAL at 08:59

## 2019-09-09 RX ADMIN — PANTOPRAZOLE SODIUM 40 MG: 40 TABLET, DELAYED RELEASE ORAL at 05:02

## 2019-09-09 RX ADMIN — CHOLESTYRAMINE 4 G: 4 POWDER, FOR SUSPENSION ORAL at 08:59

## 2019-09-09 RX ADMIN — OXYBUTYNIN CHLORIDE 5 MG: 5 TABLET, EXTENDED RELEASE ORAL at 08:59

## 2019-09-09 RX ADMIN — Medication 1 TABLET: at 08:59

## 2019-09-09 RX ADMIN — SODIUM CHLORIDE 500 MG: 9 INJECTION, SOLUTION INTRAVENOUS at 09:01

## 2019-09-09 NOTE — DISCHARGE SUMMARY
Date of Discharge:  9/9/2019    Discharge Diagnosis: ESBL positive E. coli urinary tract infection  Chronic stage III kidney disease  Advanced age    Presenting Problem/History of Present Illness  Active Hospital Problems    Diagnosis  POA   • **Urinary tract infection due to extended-spectrum beta lactamase (ESBL) producing Escherichia coli [N39.0, B96.29, Z16.12]  Yes   • ESBL (extended spectrum beta-lactamase) producing bacteria infection [A49.9, Z16.12]  Yes   • Anticoagulant long-term use [Z79.01]  Not Applicable   • Colon cancer (CMS/HCC) [C18.9]  Yes   • Action tremor [G25.2]  Yes   • Chronic kidney disease, stage II (mild) [N18.2]  Yes   • Osteoporosis, post-menopausal [M81.0]  Yes      Resolved Hospital Problems   No resolved problems to display.        Hospital Course  Patient is a 89 y.o. female presented with urinary tract infection.  Her outpatient culture positive for ESBL producing E. coli.  2 attempts were made for outpatient infusion treatment to eradicate her infection.  Both of those were unsuccessful and decision was made to admit for definitive IV antibiotic management and infectious disease consultation.  She remained hemodynamically stable through her stay.  Plan for outpatient antibiotic treatment was given by infectious disease.  Current plan is for her to go home with home health to complete a course of IV antibiotics to be given through her midline that was placed during this admission.  It is felt at this time she is stable for discharge if home health can be arranged.      Procedures Performed  Midline catheter placement       Consults:   Consults     Date and Time Order Name Status Description    9/4/2019 1403 Inpatient Infectious Diseases Consult Completed           Pertinent Test Results: microbiology: urine culture: positive for ESBL producing E. coli    Condition on Discharge: Stable    Vital Signs  Temp:  [97 °F (36.1 °C)-98.1 °F (36.7 °C)] 97 °F (36.1 °C)  Heart Rate:  [60-81]  62  Resp:  [18] 18  BP: (105-114)/(54-65) 110/57    Physical Exam:   General Appearance: alert, appears stated age and cooperative  Eyes: lids and lashes normal, conjunctivae and sclerae normal and no icterus  Neck: supple and trachea midline  Lungs: clear to auscultation, respirations regular, respirations even and respirations unlabored  Heart: regular rhythm & normal rate and normal S1, S2  Abdomen: normal bowel sounds, no masses and soft non-tender  Extremities: no edema, no cyanosis and no redness  Pulses: Pulses palpable and equal bilaterally  Neurologic: Mental Status orientated to person, place, time and situation    Discharge Disposition  home with home health    Discharge Medications     Discharge Medications      ASK your doctor about these medications      Instructions Start Date   ALPRAZolam 0.25 MG tablet  Commonly known as:  XANAX   0.125 mg, Oral, Nightly PRN      calcium citrate-vitamin d 200-250 MG-UNIT tablet tablet  Commonly known as:  CITRACAL   Oral, Daily      CloNIDine 0.1 MG tablet  Commonly known as:  CATAPRES   0.1 mg, Oral, Every 8 Hours PRN      colestipol 1 g tablet  Commonly known as:  COLESTID   1 g, Oral, 3 Times Daily      DYAZIDE 37.5-25 MG per capsule  Generic drug:  triamterene-hydrochlorothiazide   1 capsule, Oral, Every Other Day      esomeprazole 40 MG capsule  Commonly known as:  nexIUM   40 mg, Oral, Every Morning Before Breakfast      gabapentin 100 MG capsule  Commonly known as:  NEURONTIN   100 mg, Oral, Every Night at Bedtime      meclizine 12.5 MG tablet  Commonly known as:  ANTIVERT   12.5 mg, Oral, 3 Times Daily PRN      metoprolol tartrate 100 MG tablet  Commonly known as:  LOPRESSOR   50 mg, Oral, 2 Times Daily      MULTIVITAMIN ADULT PO   1 tablet, Oral, Daily      ICAPS MV PO   1 capsule, Oral, Every Night at Bedtime      MYRBETRIQ 25 MG tablet sustained-release 24 hour 24 hr tablet  Generic drug:  Mirabegron ER   25 mg, Oral, Daily at 1100      raloxifene 60 MG  tablet  Commonly known as:  EVISTA   60 mg, Oral, Nightly      ranolazine 500 MG 12 hr tablet  Commonly known as:  RANEXA   500 mg, Oral, 2 Times Daily      warfarin 3 MG tablet  Commonly known as:  COUMADIN   3 mg, Oral, Take As Directed, Sunday, Monday, Wednesday, Friday, and Saturday.      COUMADIN 3 MG tablet  Generic drug:  warfarin   4.5 mg, Oral, 2 Times Weekly, Tuesday and Thursday.             Discharge Diet: regular    Activity at Discharge: ad zelda    Follow-up Appointments  No future appointments.      Test Results Pending at Discharge       Aaron Lyons MD  09/09/19  8:20 AM    Time: Discharge 40 min

## 2019-09-09 NOTE — PLAN OF CARE
Problem: Patient Care Overview  Goal: Plan of Care Review  Outcome: Outcome(s) achieved Date Met: 09/09/19 09/09/19 0313   Coping/Psychosocial   Plan of Care Reviewed With patient   Plan of Care Review   Progress improving   OTHER   Outcome Summary No c/o burning with urination. Pt up with assist with walker and ambulates very well. Contact precautions still in place. Plan to d/c with ABX.       09/09/19 0313   Coping/Psychosocial   Plan of Care Reviewed With patient   Plan of Care Review   Progress improving   OTHER   Outcome Summary No c/o burning with urination. VSS. Pt up with assist with walker and ambulates very well. Contact precautions still in place. Plan to d/c with ABX.        Problem: Skin Injury Risk (Adult)  Goal: Skin Health and Integrity  Outcome: Outcome(s) achieved Date Met: 09/09/19      Problem: Fall Risk (Adult)  Goal: Absence of Fall  Outcome: Outcome(s) achieved Date Met: 09/09/19      Problem: Urinary Tract Infection (Adult)  Goal: Signs and Symptoms of Listed Potential Problems Will be Absent, Minimized or Managed (Urinary Tract Infection)  Outcome: Ongoing (interventions implemented as appropriate)

## 2019-09-09 NOTE — PROGRESS NOTES
Continued Stay Note   [facility]     Patient Name: Sana Laboy  MRN: [id number]  Today's Date: 9/9/2019    Admit Date: 9/4/2019    Discharge Plan     Row Name 09/09/19 1237       Plan    Plan Comments  PT IV ABX ARE COMPLETE FOR HOME AND WILL BE DELIVERED TO PT HOME THIS EVENING. [facility] IS AWARE OF PT DC. WILL NEED HH ORDERS.     Final Discharge Disposition Code  06 - home with home health care    Final Note  PT IS BEING DCD HOME TODAY. PT IV ABX WILL BE DELIVERED THIS EVENING TO PT HOME FROM [facility], PT & SON AWARE. PT COST FOR IV ABX IS $134 PER WEEK AND PT AGREES. NOTIFIED JAIDA WITH [facility]  OF PT DC. WILL NEED HH ORDERS. LEFT MESSAGE AT PHYSICIAN OFFICE TO INFORM ([phone])        Discharge Codes    No documentation.             HERMELINDA Ibrahim

## 2019-09-10 ENCOUNTER — READMISSION MANAGEMENT (OUTPATIENT)
Dept: CALL CENTER | Facility: HOSPITAL | Age: 84
End: 2019-09-10

## 2019-09-10 NOTE — OUTREACH NOTE
Prep Survey      Responses   Facility patient discharged from?  [place]   Is patient eligible?  Yes   Discharge diagnosis  ESBL positive E. coli urinary tract infection   Does the patient have one of the following disease processes/diagnoses(primary or secondary)?  Other   Does the patient have Home health ordered?  Yes   What is the Home health agency?   [facility]    Is there a DME ordered?  No   General alerts for this patient  [facility] for IV ABX    Prep survey completed?  Yes          Zainab Healy RN

## 2019-09-11 ENCOUNTER — READMISSION MANAGEMENT (OUTPATIENT)
Dept: CALL CENTER | Facility: HOSPITAL | Age: 84
End: 2019-09-11

## 2019-09-11 NOTE — OUTREACH NOTE
Medical Week 1 Survey      Responses   Facility patient discharged from?  [place]   Does the patient have one of the following disease processes/diagnoses(primary or secondary)?  Other   Is there a successful TCM telephone encounter documented?  No   Week 1 attempt successful?  Yes   Call start time  1307   Call end time  1309   General alerts for this patient  [facility] for IV ABX    Discharge diagnosis  ESBL positive E. coli urinary tract infection   Is patient permission given to speak with other caregiver?  Yes   List who call center can speak with  Any of her children    Person spoke with today (if not patient) and relationship  Raina- Daughter   Meds reviewed with patient/caregiver?  Yes   Is the patient having any side effects they believe may be caused by any medication additions or changes?  No   Does the patient have all medications ordered at discharge?  Yes   Is the patient taking all medications as directed (includes completed medication regime)?  Yes   Does the patient have a primary care provider?   Yes   Does the patient have an appointment with their PCP within 7 days of discharge?  Yes   Has the patient kept scheduled appointments due by today?  N/A   What is the Home health agency?   [facility]    Has home health visited the patient within 72 hours of discharge?  Yes   Psychosocial issues?  No   Did the patient receive a copy of their discharge instructions?  Yes   Nursing interventions  Reviewed instructions with patient   What is the patient's perception of their health status since discharge?  Improving   Is the patient/caregiver able to teach back signs and symptoms related to disease process for when to call PCP?  Yes   Is the patient/caregiver able to teach back signs and symptoms related to disease process for when to call 911?  Yes   Is the patient/caregiver able to teach back the hierarchy of who to call/visit for symptoms/problems? PCP, Specialist, Home health nurse, Urgent Care, ED, 911   Yes   Week 1 call completed?  Yes          Yaneth Sawant RN

## 2019-09-19 ENCOUNTER — READMISSION MANAGEMENT (OUTPATIENT)
Dept: CALL CENTER | Facility: HOSPITAL | Age: 84
End: 2019-09-19

## 2019-09-19 NOTE — OUTREACH NOTE
Medical Week 2 Survey      Responses   Facility patient discharged from?  [place]   Does the patient have one of the following disease processes/diagnoses(primary or secondary)?  Other   Week 2 attempt successful?  No   Unsuccessful attempts  Attempt 1          Chadwick Lazaro RN

## 2019-09-20 ENCOUNTER — READMISSION MANAGEMENT (OUTPATIENT)
Dept: CALL CENTER | Facility: HOSPITAL | Age: 84
End: 2019-09-20

## 2019-09-20 NOTE — OUTREACH NOTE
Medical Week 2 Survey      Responses   Facility patient discharged from?  [place]   Does the patient have one of the following disease processes/diagnoses(primary or secondary)?  Other   Week 2 attempt successful?  No   Unsuccessful attempts  Attempt 2          Julianna Whitten RN

## 2019-09-25 ENCOUNTER — READMISSION MANAGEMENT (OUTPATIENT)
Dept: CALL CENTER | Facility: HOSPITAL | Age: 84
End: 2019-09-25

## 2019-09-25 NOTE — OUTREACH NOTE
Medical Week 3 Survey      Responses   Facility patient discharged from?  [place]   Does the patient have one of the following disease processes/diagnoses(primary or secondary)?  Other   Week 3 attempt successful?  Yes   Call start time  0907   Call end time  0916   Discharge diagnosis  ESBL positive E. coli urinary tract infection   Meds reviewed with patient/caregiver?  Yes   Is the patient taking all medications as directed (includes completed medication regime)?  Yes   Medication comments  Completed IV antibiotics Midline catheter d/c'd.    Has the patient kept scheduled appointments due by today?  N/A   Comments  09/30 Dr Bazzi   What is the Home health agency?   [facility]    What is the patient's perception of their health status since discharge?  Improving   Week 3 Call Completed?  Yes          Julianna Whitten RN

## 2019-10-04 ENCOUNTER — READMISSION MANAGEMENT (OUTPATIENT)
Dept: CALL CENTER | Facility: HOSPITAL | Age: 84
End: 2019-10-04

## 2019-10-04 NOTE — OUTREACH NOTE
Medical Week 4 Survey      Responses   Facility patient discharged from?  [place]   Does the patient have one of the following disease processes/diagnoses(primary or secondary)?  Other   Week 4 attempt successful?  No          Alina Sharpe RN

## 2019-11-01 LAB
ALBUMIN SERPL-MCNC: 4.6 G/DL (ref 3.5–5.2)
ALP BLD-CCNC: 52 U/L (ref 35–104)
ALT SERPL-CCNC: 12 U/L (ref 5–33)
ANION GAP SERPL CALCULATED.3IONS-SCNC: 15 MMOL/L (ref 7–19)
AST SERPL-CCNC: 19 U/L (ref 5–32)
BACTERIA: NEGATIVE /HPF
BASOPHILS ABSOLUTE: 0 K/UL (ref 0–0.2)
BASOPHILS RELATIVE PERCENT: 0.5 % (ref 0–1)
BILIRUB SERPL-MCNC: 0.5 MG/DL (ref 0.2–1.2)
BILIRUBIN URINE: NEGATIVE
BLOOD, URINE: NEGATIVE
BUN BLDV-MCNC: 22 MG/DL (ref 8–23)
CALCIUM SERPL-MCNC: 9.3 MG/DL (ref 8.2–9.6)
CHLORIDE BLD-SCNC: 104 MMOL/L (ref 98–111)
CLARITY: CLEAR
CO2: 25 MMOL/L (ref 22–29)
COLOR: YELLOW
CREAT SERPL-MCNC: 1.2 MG/DL (ref 0.5–0.9)
EOSINOPHILS ABSOLUTE: 0.2 K/UL (ref 0–0.6)
EOSINOPHILS RELATIVE PERCENT: 2.7 % (ref 0–5)
EPITHELIAL CELLS, UA: 2 /HPF (ref 0–5)
GFR NON-AFRICAN AMERICAN: 42
GLUCOSE BLD-MCNC: 81 MG/DL (ref 74–109)
GLUCOSE URINE: NEGATIVE MG/DL
HCT VFR BLD CALC: 39.9 % (ref 37–47)
HEMOGLOBIN: 12.4 G/DL (ref 12–16)
HYALINE CASTS: 8 /HPF (ref 0–8)
IMMATURE GRANULOCYTES #: 0.1 K/UL
KETONES, URINE: NEGATIVE MG/DL
LEUKOCYTE ESTERASE, URINE: ABNORMAL
LYMPHOCYTES ABSOLUTE: 0.9 K/UL (ref 1.1–4.5)
LYMPHOCYTES RELATIVE PERCENT: 12 % (ref 20–40)
MCH RBC QN AUTO: 30.1 PG (ref 27–31)
MCHC RBC AUTO-ENTMCNC: 31.1 G/DL (ref 33–37)
MCV RBC AUTO: 96.8 FL (ref 81–99)
MONOCYTES ABSOLUTE: 0.6 K/UL (ref 0–0.9)
MONOCYTES RELATIVE PERCENT: 7.6 % (ref 0–10)
NEUTROPHILS ABSOLUTE: 5.9 K/UL (ref 1.5–7.5)
NEUTROPHILS RELATIVE PERCENT: 76.4 % (ref 50–65)
NITRITE, URINE: NEGATIVE
PDW BLD-RTO: 14.9 % (ref 11.5–14.5)
PH UA: 6 (ref 5–8)
PLATELET # BLD: 676 K/UL (ref 130–400)
PMV BLD AUTO: 10 FL (ref 9.4–12.3)
POTASSIUM SERPL-SCNC: 4 MMOL/L (ref 3.5–5)
PROTEIN UA: NEGATIVE MG/DL
RBC # BLD: 4.12 M/UL (ref 4.2–5.4)
RBC UA: 3 /HPF (ref 0–4)
SODIUM BLD-SCNC: 144 MMOL/L (ref 136–145)
SPECIFIC GRAVITY UA: 1.02 (ref 1–1.03)
TOTAL PROTEIN: 7.3 G/DL (ref 6.6–8.7)
UROBILINOGEN, URINE: 0.2 E.U./DL
WBC # BLD: 7.8 K/UL (ref 4.8–10.8)
WBC UA: 19 /HPF (ref 0–5)

## 2019-12-13 LAB
ALBUMIN SERPL-MCNC: 4.3 G/DL (ref 3.5–5.2)
ALP BLD-CCNC: 50 U/L (ref 35–104)
ALT SERPL-CCNC: 9 U/L (ref 5–33)
ANION GAP SERPL CALCULATED.3IONS-SCNC: 11 MMOL/L (ref 7–19)
AST SERPL-CCNC: 22 U/L (ref 5–32)
BACTERIA: ABNORMAL /HPF
BASOPHILS ABSOLUTE: 0 K/UL (ref 0–0.2)
BASOPHILS RELATIVE PERCENT: 0.4 % (ref 0–1)
BILIRUB SERPL-MCNC: 0.3 MG/DL (ref 0.2–1.2)
BILIRUBIN URINE: NEGATIVE
BLOOD, URINE: NEGATIVE
BUN BLDV-MCNC: 19 MG/DL (ref 8–23)
CALCIUM SERPL-MCNC: 8.5 MG/DL (ref 8.2–9.6)
CHLORIDE BLD-SCNC: 102 MMOL/L (ref 98–111)
CLARITY: ABNORMAL
CO2: 24 MMOL/L (ref 22–29)
COLOR: YELLOW
CREAT SERPL-MCNC: 1.2 MG/DL (ref 0.5–0.9)
EOSINOPHILS ABSOLUTE: 0.2 K/UL (ref 0–0.6)
EOSINOPHILS RELATIVE PERCENT: 2.4 % (ref 0–5)
EPITHELIAL CELLS, UA: 0 /HPF (ref 0–5)
GFR NON-AFRICAN AMERICAN: 42
GLUCOSE BLD-MCNC: 89 MG/DL (ref 74–109)
GLUCOSE URINE: NEGATIVE MG/DL
HCT VFR BLD CALC: 36.5 % (ref 37–47)
HEMOGLOBIN: 11.4 G/DL (ref 12–16)
HYALINE CASTS: 0 /HPF (ref 0–8)
IMMATURE GRANULOCYTES #: 0 K/UL
INR BLD: 1.53 (ref 0.88–1.18)
KETONES, URINE: NEGATIVE MG/DL
LEUKOCYTE ESTERASE, URINE: ABNORMAL
LYMPHOCYTES ABSOLUTE: 0.9 K/UL (ref 1.1–4.5)
LYMPHOCYTES RELATIVE PERCENT: 12.7 % (ref 20–40)
MCH RBC QN AUTO: 30.2 PG (ref 27–31)
MCHC RBC AUTO-ENTMCNC: 31.2 G/DL (ref 33–37)
MCV RBC AUTO: 96.6 FL (ref 81–99)
MONOCYTES ABSOLUTE: 0.6 K/UL (ref 0–0.9)
MONOCYTES RELATIVE PERCENT: 7.8 % (ref 0–10)
NEUTROPHILS ABSOLUTE: 5.5 K/UL (ref 1.5–7.5)
NEUTROPHILS RELATIVE PERCENT: 76.3 % (ref 50–65)
NITRITE, URINE: NEGATIVE
PDW BLD-RTO: 14.6 % (ref 11.5–14.5)
PH UA: 6 (ref 5–8)
PLATELET # BLD: 679 K/UL (ref 130–400)
PMV BLD AUTO: 10 FL (ref 9.4–12.3)
POTASSIUM SERPL-SCNC: 4 MMOL/L (ref 3.5–5)
PROTEIN UA: NEGATIVE MG/DL
PROTHROMBIN TIME: 17.7 SEC (ref 12–14.6)
RBC # BLD: 3.78 M/UL (ref 4.2–5.4)
RBC UA: 2 /HPF (ref 0–4)
SODIUM BLD-SCNC: 137 MMOL/L (ref 136–145)
SPECIFIC GRAVITY UA: 1.01 (ref 1–1.03)
TOTAL PROTEIN: 6.8 G/DL (ref 6.6–8.7)
URINE REFLEX TO CULTURE: YES
UROBILINOGEN, URINE: 0.2 E.U./DL
WBC # BLD: 7.2 K/UL (ref 4.8–10.8)
WBC UA: 221 /HPF (ref 0–5)

## 2019-12-15 LAB
ORGANISM: ABNORMAL
URINE CULTURE, ROUTINE: ABNORMAL
URINE CULTURE, ROUTINE: ABNORMAL

## 2020-01-24 LAB
BACTERIA: ABNORMAL /HPF
BILIRUBIN URINE: NEGATIVE
BLOOD, URINE: NEGATIVE
CLARITY: CLEAR
COLOR: YELLOW
EPITHELIAL CELLS, UA: 2 /HPF (ref 0–5)
GLUCOSE URINE: NEGATIVE MG/DL
HYALINE CASTS: 25 /HPF (ref 0–8)
INR BLD: 1.93 (ref 0.88–1.18)
KETONES, URINE: NEGATIVE MG/DL
LEUKOCYTE ESTERASE, URINE: ABNORMAL
NITRITE, URINE: NEGATIVE
PH UA: 5.5 (ref 5–8)
PROTEIN UA: 30 MG/DL
PROTHROMBIN TIME: 21.3 SEC (ref 12–14.6)
RBC UA: 3 /HPF (ref 0–4)
SPECIFIC GRAVITY UA: 1.02 (ref 1–1.03)
URINE REFLEX TO CULTURE: YES
UROBILINOGEN, URINE: 0.2 E.U./DL
WBC UA: 164 /HPF (ref 0–5)

## 2020-01-26 LAB
ORGANISM: ABNORMAL
URINE CULTURE, ROUTINE: ABNORMAL
URINE CULTURE, ROUTINE: ABNORMAL

## 2020-02-27 LAB
BACTERIA: NEGATIVE /HPF
BILIRUBIN URINE: NEGATIVE
BLOOD, URINE: NEGATIVE
CLARITY: CLEAR
COLOR: YELLOW
EPITHELIAL CELLS, UA: 2 /HPF (ref 0–5)
GLUCOSE URINE: NEGATIVE MG/DL
HYALINE CASTS: 3 /HPF (ref 0–8)
INR BLD: 1.28 (ref 0.88–1.18)
KETONES, URINE: NEGATIVE MG/DL
LEUKOCYTE ESTERASE, URINE: ABNORMAL
NITRITE, URINE: NEGATIVE
PH UA: 5.5 (ref 5–8)
PROTEIN UA: NEGATIVE MG/DL
PROTHROMBIN TIME: 16 SEC (ref 12–14.6)
RBC UA: 1 /HPF (ref 0–4)
SPECIFIC GRAVITY UA: 1.01 (ref 1–1.03)
URINE REFLEX TO CULTURE: YES
UROBILINOGEN, URINE: 0.2 E.U./DL
WBC UA: 12 /HPF (ref 0–5)

## 2020-02-29 LAB
ORGANISM: ABNORMAL
URINE CULTURE, ROUTINE: ABNORMAL
URINE CULTURE, ROUTINE: ABNORMAL

## 2020-03-12 LAB
INR BLD: 1.03 (ref 0.88–1.18)
PROTHROMBIN TIME: 13.4 SEC (ref 12–14.6)

## 2020-06-12 LAB
INR BLD: 1.6 (ref 0.88–1.18)
PROTHROMBIN TIME: 19.2 SEC (ref 12–14.6)

## 2021-12-02 ENCOUNTER — TRANSCRIBE ORDERS (OUTPATIENT)
Dept: ADMINISTRATIVE | Facility: HOSPITAL | Age: 86
End: 2021-12-02

## 2021-12-02 ENCOUNTER — LAB (OUTPATIENT)
Dept: LAB | Facility: HOSPITAL | Age: 86
End: 2021-12-02

## 2021-12-02 DIAGNOSIS — N39.0 URINARY TRACT INFECTION WITHOUT HEMATURIA, SITE UNSPECIFIED: Primary | ICD-10-CM

## 2021-12-02 LAB
BACTERIA UR QL AUTO: ABNORMAL /HPF
BILIRUB UR QL STRIP: NEGATIVE
CLARITY UR: CLEAR
COLOR UR: YELLOW
GLUCOSE UR STRIP-MCNC: NEGATIVE MG/DL
HGB UR QL STRIP.AUTO: NEGATIVE
HYALINE CASTS UR QL AUTO: ABNORMAL /LPF
KETONES UR QL STRIP: NEGATIVE
LEUKOCYTE ESTERASE UR QL STRIP.AUTO: ABNORMAL
NITRITE UR QL STRIP: NEGATIVE
PH UR STRIP.AUTO: 6 [PH] (ref 5–8)
PROT UR QL STRIP: NEGATIVE
RBC # UR STRIP: ABNORMAL /HPF
REF LAB TEST METHOD: ABNORMAL
SP GR UR STRIP: 1.02 (ref 1–1.03)
SQUAMOUS #/AREA URNS HPF: ABNORMAL /HPF
UROBILINOGEN UR QL STRIP: ABNORMAL
WBC # UR STRIP: ABNORMAL /HPF

## 2021-12-02 PROCEDURE — 81001 URINALYSIS AUTO W/SCOPE: CPT | Performed by: FAMILY MEDICINE

## 2021-12-02 PROCEDURE — 87086 URINE CULTURE/COLONY COUNT: CPT | Performed by: FAMILY MEDICINE

## 2021-12-03 LAB — BACTERIA SPEC AEROBE CULT: NORMAL

## 2021-12-15 ENCOUNTER — HOSPITAL ENCOUNTER (INPATIENT)
Facility: HOSPITAL | Age: 86
LOS: 3 days | Discharge: HOME | End: 2021-12-18
Attending: EMERGENCY MEDICINE | Admitting: FAMILY MEDICINE

## 2021-12-15 ENCOUNTER — APPOINTMENT (OUTPATIENT)
Dept: GENERAL RADIOLOGY | Facility: HOSPITAL | Age: 86
End: 2021-12-15

## 2021-12-15 ENCOUNTER — APPOINTMENT (OUTPATIENT)
Dept: CT IMAGING | Facility: HOSPITAL | Age: 86
End: 2021-12-15

## 2021-12-15 ENCOUNTER — NURSE TRIAGE (OUTPATIENT)
Dept: CALL CENTER | Facility: HOSPITAL | Age: 86
End: 2021-12-15

## 2021-12-15 DIAGNOSIS — N39.0 ACUTE UTI (URINARY TRACT INFECTION): ICD-10-CM

## 2021-12-15 DIAGNOSIS — E83.42 HYPOMAGNESEMIA: ICD-10-CM

## 2021-12-15 DIAGNOSIS — E87.6 HYPOKALEMIA: ICD-10-CM

## 2021-12-15 DIAGNOSIS — K57.92 DIVERTICULITIS: Primary | ICD-10-CM

## 2021-12-15 DIAGNOSIS — K63.9 SIGMOID THICKENING: ICD-10-CM

## 2021-12-15 PROBLEM — R54 ADVANCED AGE: Chronic | Status: ACTIVE | Noted: 2021-12-15

## 2021-12-15 LAB
ADV 40+41 DNA STL QL NAA+NON-PROBE: NOT DETECTED
ALBUMIN SERPL-MCNC: 3.3 G/DL (ref 3.5–5.2)
ALBUMIN/GLOB SERPL: 1.2 G/DL
ALP SERPL-CCNC: 59 U/L (ref 39–117)
ALT SERPL W P-5'-P-CCNC: 7 U/L (ref 1–33)
ANION GAP SERPL CALCULATED.3IONS-SCNC: 13 MMOL/L (ref 5–15)
ANION GAP SERPL CALCULATED.3IONS-SCNC: 7 MMOL/L (ref 5–15)
AST SERPL-CCNC: 15 U/L (ref 1–32)
ASTRO TYP 1-8 RNA STL QL NAA+NON-PROBE: NOT DETECTED
BACTERIA UR QL AUTO: ABNORMAL /HPF
BASOPHILS # BLD AUTO: 0.01 10*3/MM3 (ref 0–0.2)
BASOPHILS # BLD AUTO: 0.02 10*3/MM3 (ref 0–0.2)
BASOPHILS NFR BLD AUTO: 0.1 % (ref 0–1.5)
BASOPHILS NFR BLD AUTO: 0.1 % (ref 0–1.5)
BILIRUB SERPL-MCNC: 0.6 MG/DL (ref 0–1.2)
BILIRUB UR QL STRIP: NEGATIVE
BUN SERPL-MCNC: 13 MG/DL (ref 8–23)
BUN SERPL-MCNC: 17 MG/DL (ref 8–23)
BUN/CREAT SERPL: 13 (ref 7–25)
BUN/CREAT SERPL: 16.8 (ref 7–25)
C CAYETANENSIS DNA STL QL NAA+NON-PROBE: NOT DETECTED
C COLI+JEJ+UPSA DNA STL QL NAA+NON-PROBE: NOT DETECTED
CALCIUM SPEC-SCNC: 8.1 MG/DL (ref 8.2–9.6)
CALCIUM SPEC-SCNC: 8.2 MG/DL (ref 8.2–9.6)
CHLORIDE SERPL-SCNC: 103 MMOL/L (ref 98–107)
CHLORIDE SERPL-SCNC: 103 MMOL/L (ref 98–107)
CLARITY UR: CLEAR
CO2 SERPL-SCNC: 23 MMOL/L (ref 22–29)
CO2 SERPL-SCNC: 29 MMOL/L (ref 22–29)
COLOR UR: YELLOW
CREAT SERPL-MCNC: 1 MG/DL (ref 0.57–1)
CREAT SERPL-MCNC: 1.01 MG/DL (ref 0.57–1)
CRP SERPL-MCNC: 9.03 MG/DL (ref 0–0.5)
CRYPTOSP DNA STL QL NAA+NON-PROBE: NOT DETECTED
D-LACTATE SERPL-SCNC: 1.3 MMOL/L (ref 0.5–2)
DEPRECATED RDW RBC AUTO: 48.9 FL (ref 37–54)
DEPRECATED RDW RBC AUTO: 49.3 FL (ref 37–54)
E HISTOLYT DNA STL QL NAA+NON-PROBE: NOT DETECTED
EAEC PAA PLAS AGGR+AATA ST NAA+NON-PRB: NOT DETECTED
EC STX1+STX2 GENES STL QL NAA+NON-PROBE: NOT DETECTED
EOSINOPHIL # BLD AUTO: 0.03 10*3/MM3 (ref 0–0.4)
EOSINOPHIL # BLD AUTO: 0.03 10*3/MM3 (ref 0–0.4)
EOSINOPHIL NFR BLD AUTO: 0.2 % (ref 0.3–6.2)
EOSINOPHIL NFR BLD AUTO: 0.4 % (ref 0.3–6.2)
EPEC EAE GENE STL QL NAA+NON-PROBE: NOT DETECTED
ERYTHROCYTE [DISTWIDTH] IN BLOOD BY AUTOMATED COUNT: 14.5 % (ref 12.3–15.4)
ERYTHROCYTE [DISTWIDTH] IN BLOOD BY AUTOMATED COUNT: 14.7 % (ref 12.3–15.4)
ETEC LTA+ST1A+ST1B TOX ST NAA+NON-PROBE: NOT DETECTED
G LAMBLIA DNA STL QL NAA+NON-PROBE: NOT DETECTED
GFR SERPL CREATININE-BSD FRML MDRD: 51 ML/MIN/1.73
GFR SERPL CREATININE-BSD FRML MDRD: 51 ML/MIN/1.73
GLOBULIN UR ELPH-MCNC: 2.7 GM/DL
GLUCOSE SERPL-MCNC: 126 MG/DL (ref 65–99)
GLUCOSE SERPL-MCNC: 98 MG/DL (ref 65–99)
GLUCOSE UR STRIP-MCNC: NEGATIVE MG/DL
HCT VFR BLD AUTO: 27.4 % (ref 34–46.6)
HCT VFR BLD AUTO: 30.9 % (ref 34–46.6)
HGB BLD-MCNC: 10.1 G/DL (ref 12–15.9)
HGB BLD-MCNC: 8.8 G/DL (ref 12–15.9)
HGB UR QL STRIP.AUTO: NEGATIVE
HOLD SPECIMEN: NORMAL
HYALINE CASTS UR QL AUTO: ABNORMAL /LPF
IMM GRANULOCYTES # BLD AUTO: 0.06 10*3/MM3 (ref 0–0.05)
IMM GRANULOCYTES # BLD AUTO: 0.08 10*3/MM3 (ref 0–0.05)
IMM GRANULOCYTES NFR BLD AUTO: 0.6 % (ref 0–0.5)
IMM GRANULOCYTES NFR BLD AUTO: 0.7 % (ref 0–0.5)
INR PPP: 1.4 (ref 0.91–1.09)
INR PPP: 1.69 (ref 0.91–1.09)
KETONES UR QL STRIP: NEGATIVE
LEUKOCYTE ESTERASE UR QL STRIP.AUTO: ABNORMAL
LYMPHOCYTES # BLD AUTO: 0.61 10*3/MM3 (ref 0.7–3.1)
LYMPHOCYTES # BLD AUTO: 0.65 10*3/MM3 (ref 0.7–3.1)
LYMPHOCYTES NFR BLD AUTO: 4.8 % (ref 19.6–45.3)
LYMPHOCYTES NFR BLD AUTO: 7.2 % (ref 19.6–45.3)
MAGNESIUM SERPL-MCNC: 1.7 MG/DL (ref 1.7–2.3)
MCH RBC QN AUTO: 29.3 PG (ref 26.6–33)
MCH RBC QN AUTO: 30.4 PG (ref 26.6–33)
MCHC RBC AUTO-ENTMCNC: 32.1 G/DL (ref 31.5–35.7)
MCHC RBC AUTO-ENTMCNC: 32.7 G/DL (ref 31.5–35.7)
MCV RBC AUTO: 91.3 FL (ref 79–97)
MCV RBC AUTO: 93.1 FL (ref 79–97)
MONOCYTES # BLD AUTO: 0.6 10*3/MM3 (ref 0.1–0.9)
MONOCYTES # BLD AUTO: 1.14 10*3/MM3 (ref 0.1–0.9)
MONOCYTES NFR BLD AUTO: 7 % (ref 5–12)
MONOCYTES NFR BLD AUTO: 8.4 % (ref 5–12)
NEUTROPHILS NFR BLD AUTO: 11.58 10*3/MM3 (ref 1.7–7)
NEUTROPHILS NFR BLD AUTO: 7.21 10*3/MM3 (ref 1.7–7)
NEUTROPHILS NFR BLD AUTO: 84.6 % (ref 42.7–76)
NEUTROPHILS NFR BLD AUTO: 85.9 % (ref 42.7–76)
NITRITE UR QL STRIP: NEGATIVE
NOROVIRUS GI+II RNA STL QL NAA+NON-PROBE: NOT DETECTED
NRBC BLD AUTO-RTO: 0 /100 WBC (ref 0–0.2)
NRBC BLD AUTO-RTO: 0 /100 WBC (ref 0–0.2)
P SHIGELLOIDES DNA STL QL NAA+NON-PROBE: NOT DETECTED
PH UR STRIP.AUTO: <=5 [PH] (ref 5–8)
PLATELET # BLD AUTO: 508 10*3/MM3 (ref 140–450)
PLATELET # BLD AUTO: 520 10*3/MM3 (ref 140–450)
PMV BLD AUTO: 10.5 FL (ref 6–12)
PMV BLD AUTO: 10.6 FL (ref 6–12)
POTASSIUM SERPL-SCNC: 3 MMOL/L (ref 3.5–5.2)
POTASSIUM SERPL-SCNC: 3.1 MMOL/L (ref 3.5–5.2)
PROCALCITONIN SERPL-MCNC: 0.08 NG/ML (ref 0–0.25)
PROT SERPL-MCNC: 6 G/DL (ref 6–8.5)
PROT UR QL STRIP: NEGATIVE
PROTHROMBIN TIME: 16.6 SECONDS (ref 11.9–14.6)
PROTHROMBIN TIME: 19.2 SECONDS (ref 11.9–14.6)
RBC # BLD AUTO: 3 10*6/MM3 (ref 3.77–5.28)
RBC # BLD AUTO: 3.32 10*6/MM3 (ref 3.77–5.28)
RBC # UR STRIP: ABNORMAL /HPF
REF LAB TEST METHOD: ABNORMAL
RVA RNA STL QL NAA+NON-PROBE: NOT DETECTED
S ENT+BONG DNA STL QL NAA+NON-PROBE: NOT DETECTED
SAPO I+II+IV+V RNA STL QL NAA+NON-PROBE: NOT DETECTED
SARS-COV-2 RNA PNL SPEC NAA+PROBE: NOT DETECTED
SHIGELLA SP+EIEC IPAH ST NAA+NON-PROBE: NOT DETECTED
SODIUM SERPL-SCNC: 139 MMOL/L (ref 136–145)
SODIUM SERPL-SCNC: 139 MMOL/L (ref 136–145)
SP GR UR STRIP: 1.01 (ref 1–1.03)
SQUAMOUS #/AREA URNS HPF: ABNORMAL /HPF
UROBILINOGEN UR QL STRIP: ABNORMAL
V CHOL+PARA+VUL DNA STL QL NAA+NON-PROBE: NOT DETECTED
V CHOLERAE DNA STL QL NAA+NON-PROBE: NOT DETECTED
WBC # UR STRIP: ABNORMAL /HPF
WBC NRBC COR # BLD: 13.5 10*3/MM3 (ref 3.4–10.8)
WBC NRBC COR # BLD: 8.52 10*3/MM3 (ref 3.4–10.8)
WHOLE BLOOD HOLD SPECIMEN: NORMAL
WHOLE BLOOD HOLD SPECIMEN: NORMAL
Y ENTEROCOL DNA STL QL NAA+NON-PROBE: NOT DETECTED

## 2021-12-15 PROCEDURE — 93010 ELECTROCARDIOGRAM REPORT: CPT | Performed by: INTERNAL MEDICINE

## 2021-12-15 PROCEDURE — 81001 URINALYSIS AUTO W/SCOPE: CPT | Performed by: EMERGENCY MEDICINE

## 2021-12-15 PROCEDURE — 25010000002 IOPAMIDOL 61 % SOLUTION: Performed by: EMERGENCY MEDICINE

## 2021-12-15 PROCEDURE — 83605 ASSAY OF LACTIC ACID: CPT | Performed by: EMERGENCY MEDICINE

## 2021-12-15 PROCEDURE — 25010000002 ERTAPENEM PER 500 MG: Performed by: EMERGENCY MEDICINE

## 2021-12-15 PROCEDURE — 87040 BLOOD CULTURE FOR BACTERIA: CPT | Performed by: EMERGENCY MEDICINE

## 2021-12-15 PROCEDURE — 83735 ASSAY OF MAGNESIUM: CPT | Performed by: EMERGENCY MEDICINE

## 2021-12-15 PROCEDURE — 93005 ELECTROCARDIOGRAM TRACING: CPT | Performed by: EMERGENCY MEDICINE

## 2021-12-15 PROCEDURE — 85025 COMPLETE CBC W/AUTO DIFF WBC: CPT | Performed by: EMERGENCY MEDICINE

## 2021-12-15 PROCEDURE — 25010000002 MAGNESIUM SULFATE 2 GM/50ML SOLUTION: Performed by: EMERGENCY MEDICINE

## 2021-12-15 PROCEDURE — 99285 EMERGENCY DEPT VISIT HI MDM: CPT

## 2021-12-15 PROCEDURE — 0097U HC BIOFIRE FILMARRAY GI PANEL: CPT | Performed by: FAMILY MEDICINE

## 2021-12-15 PROCEDURE — 85610 PROTHROMBIN TIME: CPT | Performed by: EMERGENCY MEDICINE

## 2021-12-15 PROCEDURE — 85025 COMPLETE CBC W/AUTO DIFF WBC: CPT | Performed by: FAMILY MEDICINE

## 2021-12-15 PROCEDURE — 87086 URINE CULTURE/COLONY COUNT: CPT | Performed by: EMERGENCY MEDICINE

## 2021-12-15 PROCEDURE — 85610 PROTHROMBIN TIME: CPT | Performed by: FAMILY MEDICINE

## 2021-12-15 PROCEDURE — 87635 SARS-COV-2 COVID-19 AMP PRB: CPT | Performed by: EMERGENCY MEDICINE

## 2021-12-15 PROCEDURE — 84145 PROCALCITONIN (PCT): CPT | Performed by: EMERGENCY MEDICINE

## 2021-12-15 PROCEDURE — 86140 C-REACTIVE PROTEIN: CPT | Performed by: EMERGENCY MEDICINE

## 2021-12-15 PROCEDURE — 80053 COMPREHEN METABOLIC PANEL: CPT | Performed by: EMERGENCY MEDICINE

## 2021-12-15 PROCEDURE — 71045 X-RAY EXAM CHEST 1 VIEW: CPT

## 2021-12-15 PROCEDURE — 74177 CT ABD & PELVIS W/CONTRAST: CPT

## 2021-12-15 PROCEDURE — 25010000002 POTASSIUM CHLORIDE PER 2 MEQ: Performed by: EMERGENCY MEDICINE

## 2021-12-15 PROCEDURE — 36415 COLL VENOUS BLD VENIPUNCTURE: CPT | Performed by: FAMILY MEDICINE

## 2021-12-15 RX ORDER — SODIUM CHLORIDE 0.9 % (FLUSH) 0.9 %
10 SYRINGE (ML) INJECTION AS NEEDED
Status: DISCONTINUED | OUTPATIENT
Start: 2021-12-15 | End: 2021-12-18 | Stop reason: HOSPADM

## 2021-12-15 RX ORDER — RANOLAZINE 500 MG/1
500 TABLET, EXTENDED RELEASE ORAL 2 TIMES DAILY
Status: DISCONTINUED | OUTPATIENT
Start: 2021-12-15 | End: 2021-12-18 | Stop reason: HOSPADM

## 2021-12-15 RX ORDER — ACETAMINOPHEN 650 MG/1
650 SUPPOSITORY RECTAL EVERY 4 HOURS PRN
Status: DISCONTINUED | OUTPATIENT
Start: 2021-12-15 | End: 2021-12-18 | Stop reason: HOSPADM

## 2021-12-15 RX ORDER — TRAMADOL HYDROCHLORIDE 50 MG/1
50 TABLET ORAL EVERY 6 HOURS PRN
Status: DISCONTINUED | OUTPATIENT
Start: 2021-12-15 | End: 2021-12-18 | Stop reason: HOSPADM

## 2021-12-15 RX ORDER — SODIUM CHLORIDE 9 MG/ML
50 INJECTION, SOLUTION INTRAVENOUS CONTINUOUS
Status: DISCONTINUED | OUTPATIENT
Start: 2021-12-15 | End: 2021-12-18 | Stop reason: HOSPADM

## 2021-12-15 RX ORDER — METOPROLOL TARTRATE 50 MG/1
50 TABLET, FILM COATED ORAL 4 TIMES DAILY
Status: DISCONTINUED | OUTPATIENT
Start: 2021-12-15 | End: 2021-12-16

## 2021-12-15 RX ORDER — MAGNESIUM SULFATE HEPTAHYDRATE 40 MG/ML
2 INJECTION, SOLUTION INTRAVENOUS ONCE
Status: COMPLETED | OUTPATIENT
Start: 2021-12-15 | End: 2021-12-15

## 2021-12-15 RX ORDER — ACETAMINOPHEN 325 MG/1
650 TABLET ORAL EVERY 4 HOURS PRN
Status: DISCONTINUED | OUTPATIENT
Start: 2021-12-15 | End: 2021-12-18 | Stop reason: HOSPADM

## 2021-12-15 RX ORDER — POTASSIUM CHLORIDE 14.9 MG/ML
20 INJECTION INTRAVENOUS ONCE
Status: COMPLETED | OUTPATIENT
Start: 2021-12-15 | End: 2021-12-15

## 2021-12-15 RX ORDER — ALPRAZOLAM 0.25 MG/1
0.25 TABLET ORAL NIGHTLY PRN
Status: DISCONTINUED | OUTPATIENT
Start: 2021-12-15 | End: 2021-12-18 | Stop reason: HOSPADM

## 2021-12-15 RX ORDER — PANTOPRAZOLE SODIUM 40 MG/1
40 TABLET, DELAYED RELEASE ORAL EVERY MORNING
Status: DISCONTINUED | OUTPATIENT
Start: 2021-12-16 | End: 2021-12-18 | Stop reason: HOSPADM

## 2021-12-15 RX ORDER — RALOXIFENE HYDROCHLORIDE 60 MG/1
60 TABLET, FILM COATED ORAL NIGHTLY
Status: DISCONTINUED | OUTPATIENT
Start: 2021-12-15 | End: 2021-12-18 | Stop reason: HOSPADM

## 2021-12-15 RX ORDER — SODIUM CHLORIDE 0.9 % (FLUSH) 0.9 %
10 SYRINGE (ML) INJECTION EVERY 12 HOURS SCHEDULED
Status: DISCONTINUED | OUTPATIENT
Start: 2021-12-15 | End: 2021-12-18 | Stop reason: HOSPADM

## 2021-12-15 RX ORDER — WARFARIN SODIUM 2 MG/1
3 TABLET ORAL
Status: DISCONTINUED | OUTPATIENT
Start: 2021-12-15 | End: 2021-12-18 | Stop reason: HOSPADM

## 2021-12-15 RX ORDER — ACETAMINOPHEN 160 MG/5ML
650 SOLUTION ORAL EVERY 4 HOURS PRN
Status: DISCONTINUED | OUTPATIENT
Start: 2021-12-15 | End: 2021-12-18 | Stop reason: HOSPADM

## 2021-12-15 RX ORDER — GABAPENTIN 100 MG/1
100 CAPSULE ORAL NIGHTLY
Status: DISCONTINUED | OUTPATIENT
Start: 2021-12-15 | End: 2021-12-18 | Stop reason: HOSPADM

## 2021-12-15 RX ORDER — MECLIZINE HYDROCHLORIDE 25 MG/1
12.5 TABLET ORAL 3 TIMES DAILY PRN
Status: DISCONTINUED | OUTPATIENT
Start: 2021-12-15 | End: 2021-12-18 | Stop reason: HOSPADM

## 2021-12-15 RX ADMIN — SODIUM CHLORIDE, POTASSIUM CHLORIDE, SODIUM LACTATE AND CALCIUM CHLORIDE 1000 ML: 600; 310; 30; 20 INJECTION, SOLUTION INTRAVENOUS at 07:34

## 2021-12-15 RX ADMIN — POTASSIUM CHLORIDE 20 MEQ: 14.9 INJECTION, SOLUTION INTRAVENOUS at 08:16

## 2021-12-15 RX ADMIN — RANOLAZINE 500 MG: 500 TABLET, FILM COATED, EXTENDED RELEASE ORAL at 15:07

## 2021-12-15 RX ADMIN — IOPAMIDOL 100 ML: 612 INJECTION, SOLUTION INTRAVENOUS at 08:05

## 2021-12-15 RX ADMIN — RANOLAZINE 500 MG: 500 TABLET, FILM COATED, EXTENDED RELEASE ORAL at 21:06

## 2021-12-15 RX ADMIN — SODIUM CHLORIDE, PRESERVATIVE FREE 10 ML: 5 INJECTION INTRAVENOUS at 14:54

## 2021-12-15 RX ADMIN — SODIUM CHLORIDE 100 ML/HR: 9 INJECTION, SOLUTION INTRAVENOUS at 14:56

## 2021-12-15 RX ADMIN — MAGNESIUM SULFATE HEPTAHYDRATE 2 G: 2 INJECTION, SOLUTION INTRAVENOUS at 08:18

## 2021-12-15 RX ADMIN — GABAPENTIN 100 MG: 100 CAPSULE ORAL at 20:10

## 2021-12-15 RX ADMIN — RALOXIFENE 60 MG: 60 TABLET ORAL at 20:10

## 2021-12-15 RX ADMIN — ERTAPENEM SODIUM 1 G: 1 INJECTION, POWDER, LYOPHILIZED, FOR SOLUTION INTRAMUSCULAR; INTRAVENOUS at 07:02

## 2021-12-15 RX ADMIN — SODIUM CHLORIDE 100 ML/HR: 9 INJECTION, SOLUTION INTRAVENOUS at 20:11

## 2021-12-15 RX ADMIN — WARFARIN SODIUM 3 MG: 2 TABLET ORAL at 17:47

## 2021-12-15 RX ADMIN — SODIUM CHLORIDE, POTASSIUM CHLORIDE, SODIUM LACTATE AND CALCIUM CHLORIDE 500 ML: 600; 310; 30; 20 INJECTION, SOLUTION INTRAVENOUS at 06:36

## 2021-12-15 RX ADMIN — SODIUM CHLORIDE, PRESERVATIVE FREE 10 ML: 5 INJECTION INTRAVENOUS at 20:10

## 2021-12-15 RX ADMIN — METOPROLOL TARTRATE 50 MG: 50 TABLET, FILM COATED ORAL at 17:48

## 2021-12-15 NOTE — ED PROVIDER NOTES
Subjective   Patient is a 89-year-old hard of hearing very pleasant lady who came the ER complaining of weakness and generalized fatigue.  She has got a history of frequent UTIs in the past came in with abdominal discomfort weakness and diarrhea diarrhea is not something unusual for her since her surgery for colon cancer.  Currently she appears sick frail tachycardic and with a soft blood pressure but her map is still 75.      Weakness - Generalized  Severity:  Moderate  Onset quality:  Gradual  Timing:  Constant  Progression:  Worsening  Chronicity:  New  Context: urinary tract infection    Context: not alcohol use, not allergies, not change in medication, not decreased sleep, not drug use, not increased activity, not recent infection and not stress    Relieved by:  Nothing  Worsened by:  Nothing  Ineffective treatments:  None tried  Associated symptoms: abdominal pain, diarrhea, fever, frequency, lethargy and nausea    Associated symptoms: no anorexia, no chest pain, no cough, no difficulty walking, no dizziness, no drooling, no numbness in extremities, no falls, no hematochezia, no loss of consciousness, no melena, no near-syncope, no sensory-motor deficit, no shortness of breath, no syncope, no urgency and no vomiting    Risk factors: no anemia, no congestive heart failure, no family hx of stroke and no heart disease        Review of Systems   Constitutional: Positive for fever.   HENT: Negative.  Negative for drooling.    Eyes: Negative.    Respiratory: Negative.  Negative for cough and shortness of breath.    Cardiovascular: Negative.  Negative for chest pain, syncope and near-syncope.   Gastrointestinal: Positive for abdominal pain, diarrhea and nausea. Negative for anorexia, hematochezia, melena and vomiting.   Endocrine: Negative.    Genitourinary: Positive for frequency. Negative for urgency.   Musculoskeletal: Negative for falls.   Skin: Negative.    Neurological: Negative for dizziness and loss of  consciousness.   Hematological: Negative.    All other systems reviewed and are negative.      Past Medical History:   Diagnosis Date   • Cancer (CMS/HCC)    • Elevated cholesterol    • Esophageal reflux 3/4/2019   • Hypertension        Allergies   Allergen Reactions   • Ciprofloxacin Diarrhea   • Darvon [Propoxyphene] Nausea And Vomiting   • Bactrim [Sulfamethoxazole-Trimethoprim] Rash       No past surgical history on file.    No family history on file.    Social History     Socioeconomic History   • Marital status:    Tobacco Use   • Smoking status: Never Smoker   • Smokeless tobacco: Never Used   Substance and Sexual Activity   • Alcohol use: No   • Drug use: No   • Sexual activity: Defer           Objective   Physical Exam  Vitals and nursing note reviewed. Exam conducted with a chaperone present.   Constitutional:       General: She is awake. She is not in acute distress.     Appearance: Normal appearance. She is well-developed. She is not toxic-appearing.   HENT:      Head: Normocephalic and atraumatic.      Nose:      Right Nostril: No epistaxis.      Left Nostril: No epistaxis.   Eyes:      General: Lids are normal. No scleral icterus.     Conjunctiva/sclera: Conjunctivae normal.      Pupils: Pupils are equal, round, and reactive to light.   Neck:      Vascular: No hepatojugular reflux or JVD.   Cardiovascular:      Rate and Rhythm: Normal rate and regular rhythm.      Chest Wall: PMI is not displaced.      Pulses: Normal pulses. No decreased pulses.      Heart sounds: Normal heart sounds. No murmur heard.      Pulmonary:      Effort: Pulmonary effort is normal. No accessory muscle usage or respiratory distress.      Breath sounds: Normal breath sounds. No decreased breath sounds or wheezing.   Abdominal:      General: Abdomen is flat. Bowel sounds are normal. There is no distension or abdominal bruit.      Palpations: Abdomen is soft. There is no shifting dullness, fluid wave, mass or pulsatile  mass.      Tenderness: There is abdominal tenderness in the right lower quadrant and left lower quadrant. There is no right CVA tenderness, left CVA tenderness, guarding or rebound.      Hernia: No hernia is present.   Musculoskeletal:         General: Normal range of motion.      Cervical back: Normal range of motion and neck supple. No rigidity.      Right lower leg: No edema.      Left lower leg: No edema.   Skin:     General: Skin is warm and dry.      Capillary Refill: Capillary refill takes 2 to 3 seconds.      Coloration: Skin is pale. Skin is not cyanotic, jaundiced or mottled.   Neurological:      General: No focal deficit present.      Mental Status: She is alert and oriented to person, place, and time. Mental status is at baseline.      GCS: GCS eye subscore is 4. GCS verbal subscore is 5. GCS motor subscore is 6.      Cranial Nerves: Cranial nerves are intact. No cranial nerve deficit.      Sensory: Sensation is intact.      Motor: Motor function is intact.      Deep Tendon Reflexes: Reflexes are normal and symmetric.   Psychiatric:         Behavior: Behavior normal. Behavior is cooperative.         Procedures           ED Course  ED Course as of 12/15/21 1735   Wed Dec 15, 2021   0824 . Left lower quadrant diverticulitis changes.  2. No abscess or free air.  3. While the sigmoid colon wall thickening and inflammation most likely  represents diverticulitis follow-up (after treatment of diverticulitis)  to rule out an underlying sigmoid neoplasm will be needed.  This was discussed the patient and also the admitting physician [TS]   0839 Patient with nausea not feeling well some diarrhea which could be chronic CT shows diverticulitis consistent with abdominal examination she also has got a UTI the urine analysis was cath and not a clean-catch.  I have discussed this case with the patient and will admit the patient to hospital she was given a gram of IV Invanz and magnesium and potassium potassium was  replaced her heart rate is come down to 106 bpm.  Blood pressure improved to 100 systolic.  Map is 70. [TS]      ED Course User Index  [TS] Neal Stone MD                                                 MDM  Number of Diagnoses or Management Options  Diagnosis management comments: DD   mesenteric lymphadenitis, diverticulitis, intussusception, small bowel obstruction, adhesions, bowel ischemia,intraabdominal abscess, spontaneous bacterial peritonitis, hernia, urinary tract infection, ureterolithiasis,acute appendicitis, abdominal aortic aneurysm, pneumonia, porphyria and diabetic ketoacidosis as a possible cause of abdominal pain in this patient. This is a partial list of diagnoses considered.         Amount and/or Complexity of Data Reviewed  Clinical lab tests: ordered and reviewed  Tests in the radiology section of CPT®: reviewed and ordered  Tests in the medicine section of CPT®: reviewed and ordered    Risk of Complications, Morbidity, and/or Mortality  Presenting problems: moderate  Diagnostic procedures: moderate  Management options: moderate  General comments: Patient came in to the ED with diarrhea tachycardia no history of A. fib soft blood pressure will receive IV fluids IV antibiotics and a CT scan of the abdomen pelvis she may have a UTI versus colitis.  We will give her a liter Ringer's lactate of any at this time wait for the lab work-up to come back initial antibiotics blood cultures lactic acid.        Final diagnoses:   Diverticulitis   Sigmoid thickening   Acute UTI (urinary tract infection)   Hypomagnesemia   Hypokalemia       ED Disposition  ED Disposition     ED Disposition Condition Comment    Decision to Admit  Level of Care: Med/Surg [1]   Diagnosis: Diverticulitis [382347]   Admitting Physician: RAJANI WESLEY [[id number]]   Attending Physician: RAJANI WESLEY [[id number]]   Isolate for COVID?: No [0]   Bed Request Comments: tele or remote tele   Certification: I Certify That Inpatient  Hospital Services Are Medically Necessary For Greater Than 2 Midnights            No follow-up provider specified.       Medication List      No changes were made to your prescriptions during this visit.          Neal Stone MD  12/15/21 2970

## 2021-12-15 NOTE — TELEPHONE ENCOUNTER
Caller sates mom b/p 70/50 and was confirmed per two reading's. Caller states she asked me to take her to the bathroom but she can't she's that weak. Caller advised to have her lay down and elevate the lower ext's and call 911 now.     Reason for Disposition  • Weakness, severe (i.e., unable to walk or barely able to walk, requires support) AND new-onset or worsening    Additional Information  • Negative: CARDIAC ARREST suspected  • Negative: Breathing stopped  • Negative: Anaphylactic reaction (life-threatening allergic reaction)  • Negative: Asthma attack, severe (e.g., struggling for each breath, unable to speak)  • Negative: Bleeding (severe) from skin AND can't be stopped  • Negative: Bleeding (severe) from nose, mouth, vomiting, anus, vagina AND can't be stopped  • Negative: Difficulty breathing, severe (e.g., struggling for each breath, unable to speak)  • Negative: Burn, severe  • Negative: Choking, severe (e.g., unable to breathe, talk)  • Negative: Coma (e.g., unconscious, not responding to verbal or painful stimulus)  • Negative: Cyanosis (bluish or gray lips or face)  • Negative: Dehydration, severe (e.g., cold/pale/clammy skin, too weak to stand)  • Negative: Drowning, near  • Negative: Electrical shock, severe  • Negative: Heart attack suspected  • Negative: Homicidal threat  • Negative: Hyperthermia (from heat exposure) > 105 F (40.5 C) rectally or > 104 F (40.0 C) orally  • Negative: Hypothermia (from cold exposure) < 95 F (35 C) rectally or < 94 F (34.4 C) orally  • Negative: Lightning strike injury  • Negative: Meningococcal sepsis suspected (purple spots/dots with fever)  • Negative: Mental status altered (confusion) now  • Negative: Neck trauma, major (e.g., MVA, diving, trampoline, contact sports, fall > 10 feet or 3 meters, hanging)  • Negative: Penetrating wound of chest or abdomen  • Negative: Purpura/petechiae with fever (purple spots/dots with fever)  • Negative: Respiratory distress,  "severe (e.g., struggling for each breath, unable to speak)  • Negative: Seizure lasts > 5 minutes or first seizure ever  • Negative: Seizure caused by head injury  • Negative: Shock suspected (e.g., cold/pale/clammy skin, too weak to stand, low BP, rapid pulse)  • Negative: Stroke suspected (e.g., sudden onset of weakness of the face, arm or leg on one side of the body)  • Negative: Suicide attempt  • Negative: Trauma, severe    Answer Assessment - Initial Assessment Questions  1. ACUTE COMPLAINT: \"What is the main problem?\" \"Tell me what happened?\"      B/p 70/50 and weak   2. AIRWAY: \"Is he / she breathing?\" (Yes, No, Unknown)        3. BREATHING: \"Is there difficulty breathing?\" (Yes, No, Unknown)      Yes   4. CIRCULATION: \"Is there any bleeding?\" (Yes, No, Unknown)        5. CONSCIOUS: \"Is he/she awake, alert, and responding to you?\" (Yes, No, Unknown)      States weak but alert    Protocols used: 911 SYMPTOMS-ADULT-AH      "

## 2021-12-16 LAB
ANION GAP SERPL CALCULATED.3IONS-SCNC: 10 MMOL/L (ref 5–15)
BACTERIA SPEC AEROBE CULT: NO GROWTH
BASOPHILS # BLD AUTO: 0.01 10*3/MM3 (ref 0–0.2)
BASOPHILS NFR BLD AUTO: 0.2 % (ref 0–1.5)
BUN SERPL-MCNC: 13 MG/DL (ref 8–23)
BUN/CREAT SERPL: 11.8 (ref 7–25)
CALCIUM SPEC-SCNC: 8 MG/DL (ref 8.2–9.6)
CHLORIDE SERPL-SCNC: 105 MMOL/L (ref 98–107)
CO2 SERPL-SCNC: 26 MMOL/L (ref 22–29)
CREAT SERPL-MCNC: 1.1 MG/DL (ref 0.57–1)
DEPRECATED RDW RBC AUTO: 49.3 FL (ref 37–54)
EOSINOPHIL # BLD AUTO: 0.1 10*3/MM3 (ref 0–0.4)
EOSINOPHIL NFR BLD AUTO: 1.6 % (ref 0.3–6.2)
ERYTHROCYTE [DISTWIDTH] IN BLOOD BY AUTOMATED COUNT: 14.6 % (ref 12.3–15.4)
GFR SERPL CREATININE-BSD FRML MDRD: 46 ML/MIN/1.73
GLUCOSE SERPL-MCNC: 85 MG/DL (ref 65–99)
HCT VFR BLD AUTO: 27.5 % (ref 34–46.6)
HGB BLD-MCNC: 8.9 G/DL (ref 12–15.9)
IMM GRANULOCYTES # BLD AUTO: 0.05 10*3/MM3 (ref 0–0.05)
IMM GRANULOCYTES NFR BLD AUTO: 0.8 % (ref 0–0.5)
INR PPP: 1.63 (ref 0.91–1.09)
LYMPHOCYTES # BLD AUTO: 0.62 10*3/MM3 (ref 0.7–3.1)
LYMPHOCYTES NFR BLD AUTO: 9.9 % (ref 19.6–45.3)
MCH RBC QN AUTO: 30.1 PG (ref 26.6–33)
MCHC RBC AUTO-ENTMCNC: 32.4 G/DL (ref 31.5–35.7)
MCV RBC AUTO: 92.9 FL (ref 79–97)
MONOCYTES # BLD AUTO: 0.55 10*3/MM3 (ref 0.1–0.9)
MONOCYTES NFR BLD AUTO: 8.8 % (ref 5–12)
NEUTROPHILS NFR BLD AUTO: 4.92 10*3/MM3 (ref 1.7–7)
NEUTROPHILS NFR BLD AUTO: 78.7 % (ref 42.7–76)
NRBC BLD AUTO-RTO: 0 /100 WBC (ref 0–0.2)
PLATELET # BLD AUTO: 478 10*3/MM3 (ref 140–450)
PMV BLD AUTO: 10.8 FL (ref 6–12)
POTASSIUM SERPL-SCNC: 3.5 MMOL/L (ref 3.5–5.2)
PROTHROMBIN TIME: 18.7 SECONDS (ref 11.9–14.6)
RBC # BLD AUTO: 2.96 10*6/MM3 (ref 3.77–5.28)
SODIUM SERPL-SCNC: 141 MMOL/L (ref 136–145)
WBC NRBC COR # BLD: 6.25 10*3/MM3 (ref 3.4–10.8)

## 2021-12-16 PROCEDURE — 85610 PROTHROMBIN TIME: CPT | Performed by: FAMILY MEDICINE

## 2021-12-16 PROCEDURE — 85025 COMPLETE CBC W/AUTO DIFF WBC: CPT | Performed by: FAMILY MEDICINE

## 2021-12-16 PROCEDURE — 25010000002 ERTAPENEM PER 500 MG: Performed by: FAMILY MEDICINE

## 2021-12-16 PROCEDURE — 80048 BASIC METABOLIC PNL TOTAL CA: CPT | Performed by: FAMILY MEDICINE

## 2021-12-16 RX ADMIN — SODIUM CHLORIDE, PRESERVATIVE FREE 10 ML: 5 INJECTION INTRAVENOUS at 21:14

## 2021-12-16 RX ADMIN — PANTOPRAZOLE SODIUM 40 MG: 40 TABLET, DELAYED RELEASE ORAL at 06:09

## 2021-12-16 RX ADMIN — RALOXIFENE 60 MG: 60 TABLET ORAL at 21:06

## 2021-12-16 RX ADMIN — ERTAPENEM SODIUM 0.5 G: 1 INJECTION, POWDER, LYOPHILIZED, FOR SOLUTION INTRAMUSCULAR; INTRAVENOUS at 06:08

## 2021-12-16 RX ADMIN — METOPROLOL TARTRATE 25 MG: 25 TABLET, FILM COATED ORAL at 17:35

## 2021-12-16 RX ADMIN — GABAPENTIN 100 MG: 100 CAPSULE ORAL at 21:06

## 2021-12-16 RX ADMIN — RANOLAZINE 500 MG: 500 TABLET, FILM COATED, EXTENDED RELEASE ORAL at 09:00

## 2021-12-16 RX ADMIN — WARFARIN SODIUM 3 MG: 2 TABLET ORAL at 17:35

## 2021-12-16 RX ADMIN — METOPROLOL TARTRATE 25 MG: 25 TABLET, FILM COATED ORAL at 21:06

## 2021-12-16 RX ADMIN — RANOLAZINE 500 MG: 500 TABLET, FILM COATED, EXTENDED RELEASE ORAL at 21:06

## 2021-12-16 NOTE — PLAN OF CARE
Goal Outcome Evaluation:  Plan of Care Reviewed With: patient        Progress: no change  Outcome Summary: IV fluids/abx infusing. Diarrhea x1 this shift. GI panel negative. Up to BSC x1 assist. Denies pain. Safety maintained. Will continue to monitor.

## 2021-12-16 NOTE — PLAN OF CARE
Goal Outcome Evaluation:   Admitted from ER for services of Dr. Peoples. No diarrhea since arrival to floor. Tolerating solid food well. VSS. Safety maintained.

## 2021-12-16 NOTE — H&P
Patient Care Team:  Aaron Lyons MD as PCP - General  Aaron Lyons MD as PCP - Family Medicine    Chief complaint hypotension    Subjective     Patient is a 89 y.o. female presents with hypotension causing significant weakness. Onset of symptoms was abrupt starting 1 day ago.  Symptoms are associated with prodromal episodes of diarrhea and UTI symptoms.  Symptoms are aggravated by getting up out of bed early a.m. yesterday.   Symptoms improve with laying back down. Severity was fairly significant with home monitor with systolic down in the 70s.  Context unknown but she does have frequent loose stools but not that frequency.  History of hemicolectomy for cancer.  Also was having dysuria and frequency of urination.  Had urinalysis 1 week ago that was borderline but culture was negative.  Quality was described as just a generalized weakness, so weak she could not get out of bed even with assistance of her daughter.    Review of Systems   Pertinent items are noted in HPI    History  Past Medical History:   Diagnosis Date   • Cancer (CMS/HCC)    • Elevated cholesterol    • Esophageal reflux 3/4/2019   • Hypertension      No past surgical history on file.  No family history on file.  Social History     Tobacco Use   • Smoking status: Never Smoker   • Smokeless tobacco: Never Used   Substance Use Topics   • Alcohol use: No   • Drug use: No     E-cigarette/Vaping     E-cigarette/Vaping Substances     Medications Prior to Admission   Medication Sig Dispense Refill Last Dose   • ALPRAZolam (XANAX) 0.25 MG tablet Take 0.25 mg by mouth At Night As Needed for Sleep.      • calcium citrate-vitamin d (CITRACAL) 200-250 MG-UNIT tablet tablet Take 1 tablet by mouth Daily.      • colestipol (COLESTID) 1 g tablet Take 1 g by mouth 3 (Three) Times a Day.      • esomeprazole (nexIUM) 40 MG capsule Take 40 mg by mouth Every Morning Before Breakfast.      • gabapentin (NEURONTIN) 100 MG capsule Take 100 mg by mouth  every night at bedtime.      • meclizine (ANTIVERT) 12.5 MG tablet Take 12.5 mg by mouth 3 (Three) Times a Day As Needed for dizziness.      • metoprolol tartrate (LOPRESSOR) 100 MG tablet Take 50 mg by mouth 4 (Four) Times a Day.      • Mirabegron ER (MYRBETRIQ) 25 MG tablet sustained-release 24 hour 24 hr tablet Take 25 mg by mouth Daily.      • Multiple Vitamins-Minerals (MULTIVITAMIN ADULT PO) Take 1 tablet by mouth Daily.      • raloxifene (EVISTA) 60 MG tablet Take 60 mg by mouth Every Night.      • ranolazine (RANEXA) 500 MG 12 hr tablet Take 500 mg by mouth 2 (Two) Times a Day.      • triamterene-hydrochlorothiazide (DYAZIDE) 37.5-25 MG per capsule Take 1 capsule by mouth Daily.      • warfarin (COUMADIN) 3 MG tablet Take 3 mg by mouth Daily.        Allergies:  Ciprofloxacin, Darvon [propoxyphene], and Bactrim [sulfamethoxazole-trimethoprim]    Objective     Vital Signs  Temp:  [98.2 °F (36.8 °C)-98.8 °F (37.1 °C)] 98.2 °F (36.8 °C)  Heart Rate:  [] 81  Resp:  [16] 16  BP: ()/(44-69) 106/53    Physical Exam:    General Appearance: alert, appears stated age, hard of hearing and cooperative  Head: normocephalic, without obvious abnormality and atraumatic  Eyes: lids and lashes normal, conjunctivae and sclerae normal and no icterus  Neck: supple and trachea midline  Lungs: clear to auscultation, respirations regular, respirations even and respirations unlabored  Heart: regular rhythm & normal rate and normal S1, S2  Abdomen: normal bowel sounds, no guarding and no rebound tenderness, tender  LLQ  Extremities: no edema, no cyanosis and no redness  Skin: turgor normal and color normal  Neurologic: Mental Status orientated to person, place, time and situation  Psych: normal    Results Review:    I reviewed the patient's new clinical results.    Assessment/Plan       Diverticulitis    Disease of blood and blood forming organ    Chronic kidney disease, stage II (mild)    Urinary tract infection     Anticoagulant long-term use    Advanced age      Continue antibiotics covering both possible diverticulitis and urinary tract infection while awaiting culture.  Gastrointestinal pathogen PCR study negative.  Adjust beta-blocker dosage down to avoid further hypotension.  Has already had significant improvement in white blood cell count in less than 24 hours.  Monitor for frequency of stooling.  Also serial exams evaluating her abdominal pain.  Did discuss with her with her history of cancer there is least a possibility that this could be recurrence although appears more infectious.  As long as she is responding to treatment will proceed but will likely need repeat CT scan at some point to reevaluate given her history.    I discussed the patients findings and my recommendations with patient and family.     Aaron Lyons MD  12/16/21  08:04 CST    Time: Greater than 45 minutes spent coordinating patient admission

## 2021-12-16 NOTE — CASE MANAGEMENT/SOCIAL WORK
Discharge Planning Assessment  [facility]     Patient Name: Sana Laboy  MRN: [id number]  Today's Date: 12/16/2021    Admit Date: 12/15/2021     Discharge Needs Assessment     Row Name 12/16/21 1611       Living Environment    Lives With alone    Current Living Arrangements home/apartment/condo    Primary Care Provided by self    Provides Primary Care For no one    Family Caregiver if Needed child(kristofer), adult    Quality of Family Relationships helpful; involved; supportive    Able to Return to Prior Arrangements yes       Resource/Environmental Concerns    Resource/Environmental Concerns none       Transition Planning    Patient/Family Anticipates Transition to home    Patient/Family Anticipated Services at Transition none    Transportation Anticipated family or friend will provide       Discharge Needs Assessment    Readmission Within the Last 30 Days no previous admission in last 30 days    Equipment Currently Used at Home walker, rolling    Concerns to be Addressed denies needs/concerns at this time    Anticipated Changes Related to Illness none    Equipment Needed After Discharge none    Discharge Coordination/Progress Pt lives at home alone and plans to return home at d/c. She has family who can help her if needed. She denies needs and does not want home health. She has rx coverage for her meds.               Discharge Plan    No documentation.               Continued Care and Services - Admitted Since 12/15/2021    Coordination has not been started for this encounter.          Demographic Summary    No documentation.                Functional Status    No documentation.                Psychosocial    No documentation.                Abuse/Neglect    No documentation.                Legal    No documentation.                Substance Abuse    No documentation.                Patient Forms    No documentation.                   PHILIP Milton

## 2021-12-16 NOTE — PAYOR COMM NOTE
"Salinas Laboy (89 y.o. Female) [id number]  Admit 12/15   [facility] phone [phone]   Fax   Fax                 Date of Birth Social Security Number Address Home Phone MRN    10/17/1924  [address] [place] [address] [phone] [id number]    Sabianist Marital Status             Presbyterian        Admission Date Admission Type Admitting Provider Attending Provider Department, Room/Bed    12/15/21 Emergency Aaron Lyons MD Parish, Kyle Douglas, MD [facility] 3C, 378/1    Discharge Date Discharge Disposition Discharge Destination                         Attending Provider: Aaron Lyons MD    Allergies: Ciprofloxacin, Darvon [Propoxyphene], Bactrim [Sulfamethoxazole-trimethoprim]    Isolation: None   Infection: ESBL E coli (03/05/19)   Code Status: CPR   Advance Care Planning Activity    Ht: 157.5 cm (62\")   Wt: 54.4 kg (120 lb)    Admission Cmt: None   Principal Problem: Diverticulitis [K57.92]                 Active Insurance as of 12/15/2021     Primary Coverage     Payor Plan Insurance Group Employer/Plan Group    HUMANA MEDICARE REPLACEMENT HUMANA MEDICARE REPLACEMENT [id number]     Payor Plan Address Payor Plan Phone Number Payor Plan Fax Number Effective Dates    [address] [address] [phone]  1/1/2012 - None Entered    [facility] [id number]       Subscriber Name Subscriber Birth Date Member ID       SALINAS LABOY 10/17/1924 [id number]                 Emergency Contacts      (Rel.) Home Phone Work Phone Mobile Phone    MARISOL BIRD (Daughter) [phone] -- [phone]               History & Physical      Aaron Lyons MD at 12/16/21 0804              Patient Care Team:  Aaron Lyons MD as PCP - General  Aaron Lyons MD as PCP - Family Medicine    Chief complaint hypotension    Subjective     Patient is a 89 y.o. female presents with hypotension causing significant weakness. Onset of " symptoms was abrupt starting 1 day ago.  Symptoms are associated with prodromal episodes of diarrhea and UTI symptoms.  Symptoms are aggravated by getting up out of bed early a.m. yesterday.   Symptoms improve with laying back down. Severity was fairly significant with home monitor with systolic down in the 70s.  Context unknown but she does have frequent loose stools but not that frequency.  History of hemicolectomy for cancer.  Also was having dysuria and frequency of urination.  Had urinalysis 1 week ago that was borderline but culture was negative.  Quality was described as just a generalized weakness, so weak she could not get out of bed even with assistance of her daughter.    Review of Systems   Pertinent items are noted in HPI    History  Past Medical History:   Diagnosis Date   • Cancer (CMS/HCC)    • Elevated cholesterol    • Esophageal reflux 3/4/2019   • Hypertension      No past surgical history on file.  No family history on file.  Social History     Tobacco Use   • Smoking status: Never Smoker   • Smokeless tobacco: Never Used   Substance Use Topics   • Alcohol use: No   • Drug use: No     E-cigarette/Vaping     E-cigarette/Vaping Substances     Medications Prior to Admission   Medication Sig Dispense Refill Last Dose   • ALPRAZolam (XANAX) 0.25 MG tablet Take 0.25 mg by mouth At Night As Needed for Sleep.      • calcium citrate-vitamin d (CITRACAL) 200-250 MG-UNIT tablet tablet Take 1 tablet by mouth Daily.      • colestipol (COLESTID) 1 g tablet Take 1 g by mouth 3 (Three) Times a Day.      • esomeprazole (nexIUM) 40 MG capsule Take 40 mg by mouth Every Morning Before Breakfast.      • gabapentin (NEURONTIN) 100 MG capsule Take 100 mg by mouth every night at bedtime.      • meclizine (ANTIVERT) 12.5 MG tablet Take 12.5 mg by mouth 3 (Three) Times a Day As Needed for dizziness.      • metoprolol tartrate (LOPRESSOR) 100 MG tablet Take 50 mg by mouth 4 (Four) Times a Day.      • Mirabegron ER  (MYRBETRIQ) 25 MG tablet sustained-release 24 hour 24 hr tablet Take 25 mg by mouth Daily.      • Multiple Vitamins-Minerals (MULTIVITAMIN ADULT PO) Take 1 tablet by mouth Daily.      • raloxifene (EVISTA) 60 MG tablet Take 60 mg by mouth Every Night.      • ranolazine (RANEXA) 500 MG 12 hr tablet Take 500 mg by mouth 2 (Two) Times a Day.      • triamterene-hydrochlorothiazide (DYAZIDE) 37.5-25 MG per capsule Take 1 capsule by mouth Daily.      • warfarin (COUMADIN) 3 MG tablet Take 3 mg by mouth Daily.        Allergies:  Ciprofloxacin, Darvon [propoxyphene], and Bactrim [sulfamethoxazole-trimethoprim]    Objective     Vital Signs  Temp:  [98.2 °F (36.8 °C)-98.8 °F (37.1 °C)] 98.2 °F (36.8 °C)  Heart Rate:  [] 81  Resp:  [16] 16  BP: ()/(44-69) 106/53    Physical Exam:    General Appearance: alert, appears stated age, hard of hearing and cooperative  Head: normocephalic, without obvious abnormality and atraumatic  Eyes: lids and lashes normal, conjunctivae and sclerae normal and no icterus  Neck: supple and trachea midline  Lungs: clear to auscultation, respirations regular, respirations even and respirations unlabored  Heart: regular rhythm & normal rate and normal S1, S2  Abdomen: normal bowel sounds, no guarding and no rebound tenderness, tender  LLQ  Extremities: no edema, no cyanosis and no redness  Skin: turgor normal and color normal  Neurologic: Mental Status orientated to person, place, time and situation  Psych: normal    Results Review:    I reviewed the patient's new clinical results.    Assessment/Plan       Diverticulitis    Disease of blood and blood forming organ    Chronic kidney disease, stage II (mild)    Urinary tract infection    Anticoagulant long-term use    Advanced age      Continue antibiotics covering both possible diverticulitis and urinary tract infection while awaiting culture.  Gastrointestinal pathogen PCR study negative.  Adjust beta-blocker dosage down to avoid further  hypotension.  Has already had significant improvement in white blood cell count in less than 24 hours.  Monitor for frequency of stooling.  Also serial exams evaluating her abdominal pain.  Did discuss with her with her history of cancer there is least a possibility that this could be recurrence although appears more infectious.  As long as she is responding to treatment will proceed but will likely need repeat CT scan at some point to reevaluate given her history.    I discussed the patients findings and my recommendations with patient and family.     Aaron Lyons MD  12/16/21  08:04 CST    Time: Greater than 45 minutes spent coordinating patient admission      Electronically signed by Aaron Lyons MD at 12/16/21 0809          Emergency Department Notes      Neal Stone MD at 12/15/21 0644          Subjective   Patient is a 89-year-old hard of hearing very pleasant lady who came the ER complaining of weakness and generalized fatigue.  She has got a history of frequent UTIs in the past came in with abdominal discomfort weakness and diarrhea diarrhea is not something unusual for her since her surgery for colon cancer.  Currently she appears sick frail tachycardic and with a soft blood pressure but her map is still 75.      Weakness - Generalized  Severity:  Moderate  Onset quality:  Gradual  Timing:  Constant  Progression:  Worsening  Chronicity:  New  Context: urinary tract infection    Context: not alcohol use, not allergies, not change in medication, not decreased sleep, not drug use, not increased activity, not recent infection and not stress    Relieved by:  Nothing  Worsened by:  Nothing  Ineffective treatments:  None tried  Associated symptoms: abdominal pain, diarrhea, fever, frequency, lethargy and nausea    Associated symptoms: no anorexia, no chest pain, no cough, no difficulty walking, no dizziness, no drooling, no numbness in extremities, no falls, no hematochezia, no loss of  consciousness, no melena, no near-syncope, no sensory-motor deficit, no shortness of breath, no syncope, no urgency and no vomiting    Risk factors: no anemia, no congestive heart failure, no family hx of stroke and no heart disease        Review of Systems   Constitutional: Positive for fever.   HENT: Negative.  Negative for drooling.    Eyes: Negative.    Respiratory: Negative.  Negative for cough and shortness of breath.    Cardiovascular: Negative.  Negative for chest pain, syncope and near-syncope.   Gastrointestinal: Positive for abdominal pain, diarrhea and nausea. Negative for anorexia, hematochezia, melena and vomiting.   Endocrine: Negative.    Genitourinary: Positive for frequency. Negative for urgency.   Musculoskeletal: Negative for falls.   Skin: Negative.    Neurological: Negative for dizziness and loss of consciousness.   Hematological: Negative.    All other systems reviewed and are negative.      Past Medical History:   Diagnosis Date   • Cancer (CMS/HCC)    • Elevated cholesterol    • Esophageal reflux 3/4/2019   • Hypertension        Allergies   Allergen Reactions   • Ciprofloxacin Diarrhea   • Darvon [Propoxyphene] Nausea And Vomiting   • Bactrim [Sulfamethoxazole-Trimethoprim] Rash       No past surgical history on file.    No family history on file.    Social History     Socioeconomic History   • Marital status:    Tobacco Use   • Smoking status: Never Smoker   • Smokeless tobacco: Never Used   Substance and Sexual Activity   • Alcohol use: No   • Drug use: No   • Sexual activity: Defer           Objective   Physical Exam  Vitals and nursing note reviewed. Exam conducted with a chaperone present.   Constitutional:       General: She is awake. She is not in acute distress.     Appearance: Normal appearance. She is well-developed. She is not toxic-appearing.   HENT:      Head: Normocephalic and atraumatic.      Nose:      Right Nostril: No epistaxis.      Left Nostril: No epistaxis.    Eyes:      General: Lids are normal. No scleral icterus.     Conjunctiva/sclera: Conjunctivae normal.      Pupils: Pupils are equal, round, and reactive to light.   Neck:      Vascular: No hepatojugular reflux or JVD.   Cardiovascular:      Rate and Rhythm: Normal rate and regular rhythm.      Chest Wall: PMI is not displaced.      Pulses: Normal pulses. No decreased pulses.      Heart sounds: Normal heart sounds. No murmur heard.      Pulmonary:      Effort: Pulmonary effort is normal. No accessory muscle usage or respiratory distress.      Breath sounds: Normal breath sounds. No decreased breath sounds or wheezing.   Abdominal:      General: Abdomen is flat. Bowel sounds are normal. There is no distension or abdominal bruit.      Palpations: Abdomen is soft. There is no shifting dullness, fluid wave, mass or pulsatile mass.      Tenderness: There is abdominal tenderness in the right lower quadrant and left lower quadrant. There is no right CVA tenderness, left CVA tenderness, guarding or rebound.      Hernia: No hernia is present.   Musculoskeletal:         General: Normal range of motion.      Cervical back: Normal range of motion and neck supple. No rigidity.      Right lower leg: No edema.      Left lower leg: No edema.   Skin:     General: Skin is warm and dry.      Capillary Refill: Capillary refill takes 2 to 3 seconds.      Coloration: Skin is pale. Skin is not cyanotic, jaundiced or mottled.   Neurological:      General: No focal deficit present.      Mental Status: She is alert and oriented to person, place, and time. Mental status is at baseline.      GCS: GCS eye subscore is 4. GCS verbal subscore is 5. GCS motor subscore is 6.      Cranial Nerves: Cranial nerves are intact. No cranial nerve deficit.      Sensory: Sensation is intact.      Motor: Motor function is intact.      Deep Tendon Reflexes: Reflexes are normal and symmetric.   Psychiatric:         Behavior: Behavior normal. Behavior is  cooperative.         Procedures          ED Course  ED Course as of 12/15/21 1735   Wed Dec 15, 2021   0824 . Left lower quadrant diverticulitis changes.  2. No abscess or free air.  3. While the sigmoid colon wall thickening and inflammation most likely  represents diverticulitis follow-up (after treatment of diverticulitis)  to rule out an underlying sigmoid neoplasm will be needed.  This was discussed the patient and also the admitting physician [TS]   0839 Patient with nausea not feeling well some diarrhea which could be chronic CT shows diverticulitis consistent with abdominal examination she also has got a UTI the urine analysis was cath and not a clean-catch.  I have discussed this case with the patient and will admit the patient to hospital she was given a gram of IV Invanz and magnesium and potassium potassium was replaced her heart rate is come down to 106 bpm.  Blood pressure improved to 100 systolic.  Map is 70. [TS]      ED Course User Index  [TS] Neal Stone MD                                                 MDM  Number of Diagnoses or Management Options  Diagnosis management comments: DD   mesenteric lymphadenitis, diverticulitis, intussusception, small bowel obstruction, adhesions, bowel ischemia,intraabdominal abscess, spontaneous bacterial peritonitis, hernia, urinary tract infection, ureterolithiasis,acute appendicitis, abdominal aortic aneurysm, pneumonia, porphyria and diabetic ketoacidosis as a possible cause of abdominal pain in this patient. This is a partial list of diagnoses considered.         Amount and/or Complexity of Data Reviewed  Clinical lab tests: ordered and reviewed  Tests in the radiology section of CPT®: reviewed and ordered  Tests in the medicine section of CPT®: reviewed and ordered    Risk of Complications, Morbidity, and/or Mortality  Presenting problems: moderate  Diagnostic procedures: moderate  Management options: moderate  General comments: Patient came in to the ED  with diarrhea tachycardia no history of A. fib soft blood pressure will receive IV fluids IV antibiotics and a CT scan of the abdomen pelvis she may have a UTI versus colitis.  We will give her a liter Ringer's lactate of any at this time wait for the lab work-up to come back initial antibiotics blood cultures lactic acid.        Final diagnoses:   Diverticulitis   Sigmoid thickening   Acute UTI (urinary tract infection)   Hypomagnesemia   Hypokalemia       ED Disposition  ED Disposition     ED Disposition Condition Comment    Decision to Admit  Level of Care: Med/Surg [1]   Diagnosis: Diverticulitis [521870]   Admitting Physician: RAJANI LYONS [[id number]]   Attending Physician: RAJANI LYONS [[id number]]   Isolate for COVID?: No [0]   Bed Request Comments: tele or remote tele   Certification: I Certify That Inpatient Hospital Services Are Medically Necessary For Greater Than 2 Midnights            No follow-up provider specified.       Medication List      No changes were made to your prescriptions during this visit.          Neal Stone MD  12/15/21 1735      Electronically signed by Neal Stone MD at 12/15/21 1735     José Bansal RN at 12/15/21 1145        Pt assisted with bedpan per Gil ANGLIN.     José Bansal RN  12/15/21 1152      Electronically signed by José Bansal RN at 12/15/21 1152     José Bansal RN at 12/15/21 1330        [place] and water given to pt     José Bansal RN  12/15/21 1423      Electronically signed by José Bansal RN at 12/15/21 1423     José Bansal RN at 12/15/21 1535        Report given to Rojas 3C RN     José Bansal RN  12/15/21 1535      Electronically signed by José Bansal RN at 12/15/21 1535         Current Facility-Administered Medications   Medication Dose Route Frequency Provider Last Rate Last Admin   • acetaminophen (TYLENOL) tablet 650 mg  650 mg Oral Q4H PRN Rajani Lyons  MD Virgilio        Or   • acetaminophen (TYLENOL) 160 MG/5ML solution 650 mg  650 mg Oral Q4H PRN Aaron Lyons MD        Or   • acetaminophen (TYLENOL) suppository 650 mg  650 mg Rectal Q4H PRN Aaron Lyons MD       • ALPRAZolam (XANAX) tablet 0.25 mg  0.25 mg Oral Nightly PRN Aaron Lyons MD       • ertapenem (INVanz) 0.5 g in sodium chloride 0.9 % 50 mL IVPB  0.5 g Intravenous Q24H Aaron Lyons  mL/hr at 12/16/21 0608 0.5 g at 12/16/21 0608   • gabapentin (NEURONTIN) capsule 100 mg  100 mg Oral Nightly Aaron Lyons MD   100 mg at 12/15/21 2010   • meclizine (ANTIVERT) tablet 12.5 mg  12.5 mg Oral TID PRN Aaron Lyons MD       • metoprolol tartrate (LOPRESSOR) tablet 25 mg  25 mg Oral 4x Daily Aaron Lyons MD       • pantoprazole (PROTONIX) EC tablet 40 mg  40 mg Oral QAM Aaron Lyons MD   40 mg at 12/16/21 0609   • raloxifene (EVISTA) tablet 60 mg  60 mg Oral Nightly Aaron Lyons MD   60 mg at 12/15/21 2010   • ranolazine (RANEXA) 12 hr tablet 500 mg  500 mg Oral BID Aaron Lyons MD   500 mg at 12/16/21 0900   • sodium chloride 0.9 % flush 10 mL  10 mL Intravenous PRN Aaron Lyons MD       • sodium chloride 0.9 % flush 10 mL  10 mL Intravenous Q12H Aaron Lyons MD   10 mL at 12/15/21 2010   • sodium chloride 0.9 % flush 10 mL  10 mL Intravenous PRN Aaron Lyons MD       • sodium chloride 0.9 % infusion  50 mL/hr Intravenous Continuous Aaron Lyons  mL/hr at 12/15/21 2011 100 mL/hr at 12/15/21 2011   • traMADol (ULTRAM) tablet 50 mg  50 mg Oral Q6H PRN Aaron Lyons MD       • warfarin (COUMADIN) tablet 3 mg  3 mg Oral Daily Aaron Lyons MD   3 mg at 12/15/21 1747       12/16 IV fluids/abx infusing. Diarrhea x1 this shift. GI panel negative. Up to BSC x1 assist. Denies pain

## 2021-12-17 LAB
ANION GAP SERPL CALCULATED.3IONS-SCNC: 8 MMOL/L (ref 5–15)
BASOPHILS # BLD AUTO: 0.02 10*3/MM3 (ref 0–0.2)
BASOPHILS NFR BLD AUTO: 0.4 % (ref 0–1.5)
BUN SERPL-MCNC: 11 MG/DL (ref 8–23)
BUN/CREAT SERPL: 9.9 (ref 7–25)
CALCIUM SPEC-SCNC: 7.5 MG/DL (ref 8.2–9.6)
CHLORIDE SERPL-SCNC: 111 MMOL/L (ref 98–107)
CO2 SERPL-SCNC: 25 MMOL/L (ref 22–29)
CREAT SERPL-MCNC: 1.11 MG/DL (ref 0.57–1)
DEPRECATED RDW RBC AUTO: 51 FL (ref 37–54)
EOSINOPHIL # BLD AUTO: 0.17 10*3/MM3 (ref 0–0.4)
EOSINOPHIL NFR BLD AUTO: 3.5 % (ref 0.3–6.2)
ERYTHROCYTE [DISTWIDTH] IN BLOOD BY AUTOMATED COUNT: 14.8 % (ref 12.3–15.4)
GFR SERPL CREATININE-BSD FRML MDRD: 45 ML/MIN/1.73
GLUCOSE SERPL-MCNC: 91 MG/DL (ref 65–99)
HCT VFR BLD AUTO: 26.7 % (ref 34–46.6)
HGB BLD-MCNC: 8.6 G/DL (ref 12–15.9)
IMM GRANULOCYTES # BLD AUTO: 0.04 10*3/MM3 (ref 0–0.05)
IMM GRANULOCYTES NFR BLD AUTO: 0.8 % (ref 0–0.5)
INR PPP: 1.57 (ref 0.91–1.09)
LYMPHOCYTES # BLD AUTO: 0.59 10*3/MM3 (ref 0.7–3.1)
LYMPHOCYTES NFR BLD AUTO: 12.1 % (ref 19.6–45.3)
MCH RBC QN AUTO: 30 PG (ref 26.6–33)
MCHC RBC AUTO-ENTMCNC: 32.2 G/DL (ref 31.5–35.7)
MCV RBC AUTO: 93 FL (ref 79–97)
MONOCYTES # BLD AUTO: 0.5 10*3/MM3 (ref 0.1–0.9)
MONOCYTES NFR BLD AUTO: 10.2 % (ref 5–12)
NEUTROPHILS NFR BLD AUTO: 3.56 10*3/MM3 (ref 1.7–7)
NEUTROPHILS NFR BLD AUTO: 73 % (ref 42.7–76)
NRBC BLD AUTO-RTO: 0 /100 WBC (ref 0–0.2)
PLATELET # BLD AUTO: 473 10*3/MM3 (ref 140–450)
PMV BLD AUTO: 10.6 FL (ref 6–12)
POTASSIUM SERPL-SCNC: 3.2 MMOL/L (ref 3.5–5.2)
PROTHROMBIN TIME: 18.2 SECONDS (ref 11.9–14.6)
QT INTERVAL: 354 MS
QTC INTERVAL: 500 MS
RBC # BLD AUTO: 2.87 10*6/MM3 (ref 3.77–5.28)
SODIUM SERPL-SCNC: 144 MMOL/L (ref 136–145)
WBC NRBC COR # BLD: 4.88 10*3/MM3 (ref 3.4–10.8)

## 2021-12-17 PROCEDURE — 85610 PROTHROMBIN TIME: CPT | Performed by: FAMILY MEDICINE

## 2021-12-17 PROCEDURE — 80048 BASIC METABOLIC PNL TOTAL CA: CPT | Performed by: FAMILY MEDICINE

## 2021-12-17 PROCEDURE — 25010000002 ERTAPENEM PER 500 MG: Performed by: FAMILY MEDICINE

## 2021-12-17 PROCEDURE — 85025 COMPLETE CBC W/AUTO DIFF WBC: CPT | Performed by: FAMILY MEDICINE

## 2021-12-17 PROCEDURE — 36415 COLL VENOUS BLD VENIPUNCTURE: CPT | Performed by: FAMILY MEDICINE

## 2021-12-17 RX ORDER — LEVOTHYROXINE SODIUM 0.07 MG/1
75 TABLET ORAL
Status: DISCONTINUED | OUTPATIENT
Start: 2021-12-17 | End: 2021-12-18 | Stop reason: HOSPADM

## 2021-12-17 RX ADMIN — WARFARIN SODIUM 3 MG: 2 TABLET ORAL at 17:48

## 2021-12-17 RX ADMIN — METOPROLOL TARTRATE 25 MG: 25 TABLET, FILM COATED ORAL at 08:13

## 2021-12-17 RX ADMIN — SODIUM CHLORIDE 50 ML/HR: 9 INJECTION, SOLUTION INTRAVENOUS at 11:22

## 2021-12-17 RX ADMIN — GABAPENTIN 100 MG: 100 CAPSULE ORAL at 22:32

## 2021-12-17 RX ADMIN — RANOLAZINE 500 MG: 500 TABLET, FILM COATED, EXTENDED RELEASE ORAL at 08:13

## 2021-12-17 RX ADMIN — SODIUM CHLORIDE, PRESERVATIVE FREE 10 ML: 5 INJECTION INTRAVENOUS at 22:33

## 2021-12-17 RX ADMIN — RANOLAZINE 500 MG: 500 TABLET, FILM COATED, EXTENDED RELEASE ORAL at 22:32

## 2021-12-17 RX ADMIN — METOPROLOL TARTRATE 25 MG: 25 TABLET, FILM COATED ORAL at 17:48

## 2021-12-17 RX ADMIN — ERTAPENEM SODIUM 0.5 G: 1 INJECTION, POWDER, LYOPHILIZED, FOR SOLUTION INTRAMUSCULAR; INTRAVENOUS at 06:22

## 2021-12-17 RX ADMIN — PANTOPRAZOLE SODIUM 40 MG: 40 TABLET, DELAYED RELEASE ORAL at 06:22

## 2021-12-17 RX ADMIN — METOPROLOL TARTRATE 25 MG: 25 TABLET, FILM COATED ORAL at 11:22

## 2021-12-17 RX ADMIN — RALOXIFENE 60 MG: 60 TABLET ORAL at 22:33

## 2021-12-17 RX ADMIN — METOPROLOL TARTRATE 25 MG: 25 TABLET, FILM COATED ORAL at 22:32

## 2021-12-17 RX ADMIN — LEVOTHYROXINE SODIUM 75 MCG: 75 TABLET ORAL at 08:16

## 2021-12-17 NOTE — PLAN OF CARE
Goal Outcome Evaluation:  Plan of Care Reviewed With: patient        Progress: no change  Outcome Summary: IV fluids/abx continue. Liquid BM x2 this shift. Up to BR with walker and standby assist. VSS. NSR on tele. Denies pain. Will continue to monitor.

## 2021-12-17 NOTE — PROGRESS NOTES
Continued Stay Note   [place]     Patient Name: Sana Laboy  MRN: [id number]  Today's Date: 12/17/2021    Admit Date: 12/15/2021     Discharge Plan     Row Name 12/17/21 1454       Plan    Plan Home    Plan Comments SS following, patient plans to return home at discharge. No definite discharge planning needs identified.    Final Discharge Disposition Code 01 - home or self-care             MADI Balbuena

## 2021-12-17 NOTE — PROGRESS NOTES
Family Medicine Progress Note    Patient:  Sana Laboy  YOB: 1924    MRN: [id number]     Acct: [id number]     Admit date: 12/15/2021    Patient Seen, Chart, Consults notes, Labs, Radiology studies reviewed.    Subjective: Day 2 of stay with diverticulitis and presumptive urinary tract infection and most recent (in last 24 hours) has had good night sleep and no episodes of hypotension.  Has been ambulating to the bathroom with no lightheadedness.    Past, Family, Social History unchanged from admission.    Diet: Diet Regular    Medications:  Scheduled Meds:ertapenem, 0.5 g, Intravenous, Q24H  gabapentin, 100 mg, Oral, Nightly  levothyroxine, 75 mcg, Oral, Q AM  metoprolol tartrate, 25 mg, Oral, 4x Daily  pantoprazole, 40 mg, Oral, QAM  raloxifene, 60 mg, Oral, Nightly  ranolazine, 500 mg, Oral, BID  sodium chloride, 10 mL, Intravenous, Q12H  warfarin, 3 mg, Oral, Daily      Continuous Infusions:sodium chloride, 50 mL/hr, Last Rate: 50 mL/hr (12/16/21 1013)      PRN Meds:•  acetaminophen **OR** acetaminophen **OR** acetaminophen  •  ALPRAZolam  •  meclizine  •  [COMPLETED] Insert peripheral IV **AND** sodium chloride  •  sodium chloride  •  traMADol    Objective:    Lab Results (last 24 hours)     Procedure Component Value Units Date/Time    Basic Metabolic Panel [[id number]]  (Abnormal) Collected: 12/17/21 0555    Specimen: Blood Updated: 12/17/21 0640     Glucose 91 mg/dL      BUN 11 mg/dL      Creatinine 1.11 mg/dL      Sodium 144 mmol/L      Potassium 3.2 mmol/L      Chloride 111 mmol/L      CO2 25.0 mmol/L      Calcium 7.5 mg/dL      eGFR Non African Amer 45 mL/min/1.73      BUN/Creatinine Ratio 9.9     Anion Gap 8.0 mmol/L     Narrative:      GFR Normal >60  Chronic Kidney Disease <60  Kidney Failure <15      Protime-INR [[id number]]  (Abnormal) Collected: 12/17/21 0555    Specimen: Blood Updated: 12/17/21 0625     Protime 18.2 Seconds      INR 1.57    CBC & Differential [[id number]]   (Abnormal) Collected: 12/17/21 0555    Specimen: Blood Updated: 12/17/21 0619    Narrative:      The following orders were created for panel order CBC & Differential.  Procedure                               Abnormality         Status                     ---------                               -----------         ------                     CBC Auto Differential[[id number]]        Abnormal            Final result                 Please view results for these tests on the individual orders.    CBC Auto Differential [[id number]]  (Abnormal) Collected: 12/17/21 0555    Specimen: Blood Updated: 12/17/21 0619     WBC 4.88 10*3/mm3      RBC 2.87 10*6/mm3      Hemoglobin 8.6 g/dL      Hematocrit 26.7 %      MCV 93.0 fL      MCH 30.0 pg      MCHC 32.2 g/dL      RDW 14.8 %      RDW-SD 51.0 fl      MPV 10.6 fL      Platelets 473 10*3/mm3      Neutrophil % 73.0 %      Lymphocyte % 12.1 %      Monocyte % 10.2 %      Eosinophil % 3.5 %      Basophil % 0.4 %      Immature Grans % 0.8 %      Neutrophils, Absolute 3.56 10*3/mm3      Lymphocytes, Absolute 0.59 10*3/mm3      Monocytes, Absolute 0.50 10*3/mm3      Eosinophils, Absolute 0.17 10*3/mm3      Basophils, Absolute 0.02 10*3/mm3      Immature Grans, Absolute 0.04 10*3/mm3      nRBC 0.0 /100 WBC     Urine Culture - Urine, Urine, Clean Catch [[id number]]  (Normal) Collected: 12/15/21 0607    Specimen: Urine, Clean Catch Updated: 12/16/21 1054     Urine Culture No growth    Blood Culture - Blood, Hand, Left [[id number]]  (Normal) Collected: 12/15/21 0700    Specimen: Blood from Hand, Left Updated: 12/16/21 0846     Blood Culture No growth at 24 hours    Blood Culture - Blood, Hand, Right [[id number]]  (Normal) Collected: 12/15/21 0700    Specimen: Blood from Hand, Right Updated: 12/16/21 0846     Blood Culture No growth at 24 hours           Imaging Results (Last 72 Hours)     Procedure Component Value Units Date/Time    CT Abdomen Pelvis With Contrast [[id number]] Collected: 12/15/21  0808     Updated: 12/15/21 0817    Narrative:      EXAMINATION: CT ABDOMEN PELVIS W CONTRAST-      12/15/2021 7:52 AM CST     HISTORY: Abdominal pain. Generalized weakness. UTI history. Infection  suspected.     In order to have a CT radiation dose as low as reasonably achievable  Automated Exposure Control was utilized for adjustment of the mA and/or  KV according to patient size.     DLP in mGycm= 213.     Abdomen/pelvis CT with IV contrast injection.     Comparison is made with a noncontrast abdomen/pelvis CT from March 4, 2019.     Comparison is made with postcontrast imaging from March 29, 2016.     Heart size is within normal limits.  No acute abnormality at the lung bases.     The gallbladder is not visualized.  10 mm low-density focus within the posterior segment right hepatic lobe  compatible with cyst or hemangioma.  Otherwise normal appearance of the liver.  Normal pancreas.  A a few low-density foci within the spleen were seen previously.  Spleen size is appropriate.     Bilateral adrenal nodules are again seen.  Over 5 years the right adrenal nodule has grown slightly now measuring  33 x 22 mm compared with 31 x 19 mm.  These nodules represent bilateral adrenal adenomas.     Aortic calcification.  No aneurysm.  IVC filter present.     Symmetric kidneys with no hydronephrosis.     No bowel dilation.     Left lower quadrant sigmoid colon wall thickening and inflammation.  Probable diverticulitis though follow-up to rule out an underlying  sigmoid colon neoplasm will be needed.     No pelvic fluid or abscess.     No lymphadenopathy is seen.     Pelvic phleboliths noted.     Summary:  1. Left lower quadrant diverticulitis changes.  2. No abscess or free air.  3. While the sigmoid colon wall thickening and inflammation most likely  represents diverticulitis follow-up (after treatment of diverticulitis)  to rule out an underlying sigmoid neoplasm will be needed.                                   This report  "was finalized on 12/15/2021 08:14 by Dr. tAul Baker MD.    XR Chest 1 View [[id number]] Collected: 12/15/21 0700     Updated: 12/15/21 0704    Narrative:      EXAMINATION: Chest 1 view 12/15/2021     HISTORY: Generalized weakness.     FINDINGS: Upright frontal projection of the chest demonstrates mild  bibasilar subsegmental atelectasis. Lungs are otherwise clear. No  evidence of consolidative pneumonia or effusion. The thoracic aorta is  ectatic. Old left-sided rib fractures are present. There is arthritic  change of both shoulders.       Impression:      1. Bibasilar atelectasis. Lungs are otherwise clear.  This report was finalized on 12/15/2021 07:01 by Dr. Bola Grover MD.           Physical Exam:    Vitals: /90 (BP Location: Right arm, Patient Position: Lying)   Pulse 77   Temp 98.7 °F (37.1 °C) (Oral)   Resp 16   Ht 157.5 cm (62\")   Wt 54.4 kg (120 lb)   SpO2 92%   BMI 21.95 kg/m²   24 hour intake/output:No intake or output data in the 24 hours ending 12/17/21 0734  Last 3 weights:  Wt Readings from Last 3 Encounters:   12/15/21 54.4 kg (120 lb)   09/04/19 59.9 kg (132 lb)   03/04/19 61.1 kg (134 lb 9.6 oz)       General Appearance alert, appears stated age and cooperative  Head normocephalic, without obvious abnormality and atraumatic  Eyes lids and lashes normal, conjunctivae and sclerae normal and no icterus  Neck supple and trachea midline  Lungs clear to auscultation, respirations regular, respirations even and respirations unlabored  Heart regular rhythm & normal rate, normal S1, S2 and no murmur, no gallop, no rub  Abdomen normal bowel sounds, no masses, no guarding and no rebound tenderness, tender  LLQ  Extremities no edema, no cyanosis and no redness  Skin turgor normal and color normal  Neurologic Mental Status orientated to person, place, time and situation, Cranial Nerves cranial nerves 2 - 12 grossly intact as examined        Assessment:      Diverticulitis    Disease of " blood and blood forming organ    Chronic kidney disease, stage II (mild)    Urinary tract infection    Anticoagulant long-term use    Advanced age          Plan:  Continue IV antibiotic coverage.  Thus far culture of urine negative.  Symptomatically improved with less left lower quadrant tenderness.  If continued improvement possible transition to oral antibiotics and home over the weekend.  Has good family support.  May need follow-up CT scan as an outpatient given history of colon cancer to ensure that this is all infectious etiology.      Electronically signed by Aaron Lyons MD on 12/17/2021 at 07:34 CST

## 2021-12-18 ENCOUNTER — READMISSION MANAGEMENT (OUTPATIENT)
Dept: CALL CENTER | Facility: HOSPITAL | Age: 86
End: 2021-12-18

## 2021-12-18 VITALS
RESPIRATION RATE: 16 BRPM | HEIGHT: 62 IN | BODY MASS INDEX: 22.08 KG/M2 | SYSTOLIC BLOOD PRESSURE: 135 MMHG | HEART RATE: 80 BPM | WEIGHT: 120 LBS | DIASTOLIC BLOOD PRESSURE: 70 MMHG | OXYGEN SATURATION: 96 % | TEMPERATURE: 97.5 F

## 2021-12-18 PROCEDURE — 25010000002 ERTAPENEM PER 500 MG: Performed by: FAMILY MEDICINE

## 2021-12-18 RX ORDER — LEVOTHYROXINE SODIUM 0.07 MG/1
75 TABLET ORAL
Qty: 30 TABLET | Refills: 0 | Status: SHIPPED | OUTPATIENT
Start: 2021-12-19

## 2021-12-18 RX ORDER — AMOXICILLIN AND CLAVULANATE POTASSIUM 875; 125 MG/1; MG/1
1 TABLET, FILM COATED ORAL 2 TIMES DAILY
Qty: 14 TABLET | Refills: 0 | Status: ON HOLD | OUTPATIENT
Start: 2021-12-18 | End: 2022-02-24

## 2021-12-18 RX ADMIN — PANTOPRAZOLE SODIUM 40 MG: 40 TABLET, DELAYED RELEASE ORAL at 06:31

## 2021-12-18 RX ADMIN — ERTAPENEM SODIUM 0.5 G: 1 INJECTION, POWDER, LYOPHILIZED, FOR SOLUTION INTRAMUSCULAR; INTRAVENOUS at 06:30

## 2021-12-18 RX ADMIN — SODIUM CHLORIDE 50 ML/HR: 9 INJECTION, SOLUTION INTRAVENOUS at 05:09

## 2021-12-18 RX ADMIN — RANOLAZINE 500 MG: 500 TABLET, FILM COATED, EXTENDED RELEASE ORAL at 09:06

## 2021-12-18 RX ADMIN — LEVOTHYROXINE SODIUM 75 MCG: 75 TABLET ORAL at 06:31

## 2021-12-18 RX ADMIN — METOPROLOL TARTRATE 25 MG: 25 TABLET, FILM COATED ORAL at 09:06

## 2021-12-18 NOTE — OUTREACH NOTE
Prep Survey      Responses   [facility] patient discharged from? [place]   Is LACE score < 7 ? No   Emergency Room discharge w/ pulse ox? No   Eligibility Readm Mgmt   Discharge diagnosis Diverticulitis    Does the patient have one of the following disease processes/diagnoses(primary or secondary)? Other   Does the patient have Home health ordered? No   Is there a DME ordered? No   Prep survey completed? Yes          Sugar Santos RN

## 2021-12-18 NOTE — PLAN OF CARE
Goal Outcome Evaluation:         Continuing IV antibiotics. If continued improvement may switch to oral antibiotics and home over the weekend. Pt has denied any pain today. Will continue to monitor pt.

## 2021-12-18 NOTE — DISCHARGE SUMMARY
Hospital Discharge Summary    Sana Laboy  :  10/17/1924  MRN:  [id number]    Admit date:  12/15/2021  Discharge date:  2021    Admitting Physician:    Aaron Lyons MD    Discharge Diagnoses:    Principal Problem: Diverticulitis  Active Hospital Problems    Diagnosis  POA   • **Diverticulitis [K57.92]  Yes   • Advanced age [R54]  Yes   • Anticoagulant long-term use [Z79.01]  Not Applicable   • Urinary tract infection [N39.0]  Yes   • Chronic kidney disease, stage II (mild) [N18.2]  Yes   • Disease of blood and blood forming organ [D75.9]  Yes      Resolved Hospital Problems   No resolved problems to display.       Consults: none  Significant Diagnostic Studies: CT A/P 12/15    Hospital Course:   Sana Laboy is a 89 y.o. female admitted on 12/15/2021 with weakness from diverticulitis.  She was started on treatment with empiric IV antibiotics.  The patient was monitored with serial abdominal exams.  She was continued on her home medications for her stable chronic medical conditions.    She improved with the treatment plan.  On day of discharge, patient was pleased with her progress and comfortable with continuing her care at home under the care of her family.  She was changed to augmentin due to her allergies to fluoroquinolones and bactrim.      EXAM 2021   GEN: Awake, alert, no acute distress.  RESP: Full and symmetric respirations.  Chest clear to auscultation bilaterally, no wheezes, rales, or rhonchi.  CARD: Regular rate and rhythm.  No murmurs, rubs or gallops.  ABD: Normoactive bowel sounds.  Abdomen soft, nontender, and nondistended.  EXT: No cyanosis, clubbing, or edema.         Discharge Medications      New Medications      Instructions Start Date   amoxicillin-clavulanate 875-125 MG per tablet  Commonly known as: Augmentin   1 tablet, Oral, 2 Times Daily      levothyroxine 75 MCG tablet  Commonly known as: SYNTHROID, LEVOTHROID   75 mcg, Oral, Every Early Morning   Start  Date: December 19, 2021        Continue These Medications      Instructions Start Date   ALPRAZolam 0.25 MG tablet  Commonly known as: XANAX   0.25 mg, Oral, Nightly PRN      calcium citrate-vitamin d 200-250 MG-UNIT tablet tablet  Commonly known as: CITRACAL   1 tablet, Oral, Daily      colestipol 1 g tablet  Commonly known as: COLESTID   1 g, Oral, 3 Times Daily      Dyazide 37.5-25 MG per capsule  Generic drug: triamterene-hydrochlorothiazide   1 capsule, Oral, Daily      esomeprazole 40 MG capsule  Commonly known as: nexIUM   40 mg, Oral, Every Morning Before Breakfast      gabapentin 100 MG capsule  Commonly known as: NEURONTIN   100 mg, Oral, Every Night at Bedtime      meclizine 12.5 MG tablet  Commonly known as: ANTIVERT   12.5 mg, Oral, 3 Times Daily PRN      metoprolol tartrate 100 MG tablet  Commonly known as: LOPRESSOR   50 mg, Oral, 4 Times Daily      multivitamin with minerals tablet tablet   1 tablet, Oral, Daily      Myrbetriq 25 MG tablet sustained-release 24 hour 24 hr tablet  Generic drug: Mirabegron ER   25 mg, Oral, Daily at 1100      raloxifene 60 MG tablet  Commonly known as: EVISTA   60 mg, Oral, Nightly      ranolazine 500 MG 12 hr tablet  Commonly known as: RANEXA   500 mg, Oral, 2 Times Daily      warfarin 3 MG tablet  Commonly known as: COUMADIN   3 mg, Oral, Daily             Discharge Diet:  low Na  Discharge Activity:  increase to former level as tolerated    Disposition: to home, in good condition  Follow up with Aaron Lyons MD within 1 week.    >30minutes was spent in the discharge planning and coordination process.    Signed:  Ronny Jiang MD, 12/18/2021 at 09:00 CST

## 2021-12-20 LAB
BACTERIA SPEC AEROBE CULT: NORMAL
BACTERIA SPEC AEROBE CULT: NORMAL

## 2021-12-20 NOTE — PAYOR COMM NOTE
"REF: [id number]    [facility],  [phone]  OR  FAX  [phone]      Salinas Laboy (89 y.o. Female)             Date of Birth Social Security Number Address Home Phone MRN    10/17/1924  [address]  [facility] [id number] [phone] [id number]    [employer] Marital Status             [facility]        Admission Date Admission Type Admitting Provider Attending Provider Department, Room/Bed    12/15/21 Emergency Aaron Lyons MD  [facility] 3C, 378/1    Discharge Date Discharge Disposition Discharge Destination          2021 Home or Self Care              Attending Provider: (none)   Allergies: Ciprofloxacin, Darvon [Propoxyphene], Bactrim [Sulfamethoxazole-trimethoprim]    Isolation: None   Infection: ESBL E coli (19)   Code Status: Prior   Advance Care Planning Activity    Ht: 157.5 cm (62\")   Wt: 54.4 kg (120 lb)    Admission Cmt: None   Principal Problem: Diverticulitis [K57.92]                 Active Insurance as of 12/15/2021     Primary Coverage     Payor Plan Insurance Group Employer/Plan Group    HUMANA MEDICARE REPLACEMENT HUMANA MEDICARE REPLACEMENT [id number]     Payor Plan Address Payor Plan Phone Number Payor Plan Fax Number Effective Dates    [address] [address] [phone]  2012 - None Entered    [facility] [id number]       Subscriber Name Subscriber Birth Date Member ID       SALINAS LABOY 10/17/1924 [id number]                 Emergency Contacts      (Rel.) Home Phone Work Phone Mobile Phone    MARISOL BIRD (Daughter) [phone] -- [phone]               Discharge Summary      Ronny Jiang MD at 21 0827          Hospital Discharge Summary    Salinas Laboy  :  10/17/1924  MRN:  [id number]    Admit date:  12/15/2021  Discharge date:  2021    Admitting Physician:    Aaron Lyons MD    Discharge Diagnoses:    Principal Problem: Diverticulitis  Active Hospital Problems    Diagnosis  " POA   • **Diverticulitis [K57.92]  Yes   • Advanced age [R54]  Yes   • Anticoagulant long-term use [Z79.01]  Not Applicable   • Urinary tract infection [N39.0]  Yes   • Chronic kidney disease, stage II (mild) [N18.2]  Yes   • Disease of blood and blood forming organ [D75.9]  Yes      Resolved Hospital Problems   No resolved problems to display.       Consults: none  Significant Diagnostic Studies: CT A/P 12/15    Hospital Course:   Sana Laboy is a 89 y.o. female admitted on 12/15/2021 with weakness from diverticulitis.  She was started on treatment with empiric IV antibiotics.  The patient was monitored with serial abdominal exams.  She was continued on her home medications for her stable chronic medical conditions.    She improved with the treatment plan.  On day of discharge, patient was pleased with her progress and comfortable with continuing her care at home under the care of her family.  She was changed to augmentin due to her allergies to fluoroquinolones and bactrim.      EXAM 12/18/2021   GEN: Awake, alert, no acute distress.  RESP: Full and symmetric respirations.  Chest clear to auscultation bilaterally, no wheezes, rales, or rhonchi.  CARD: Regular rate and rhythm.  No murmurs, rubs or gallops.  ABD: Normoactive bowel sounds.  Abdomen soft, nontender, and nondistended.  EXT: No cyanosis, clubbing, or edema.         Discharge Medications      New Medications      Instructions Start Date   amoxicillin-clavulanate 875-125 MG per tablet  Commonly known as: Augmentin   1 tablet, Oral, 2 Times Daily      levothyroxine 75 MCG tablet  Commonly known as: SYNTHROID, LEVOTHROID   75 mcg, Oral, Every Early Morning   Start Date: December 19, 2021        Continue These Medications      Instructions Start Date   ALPRAZolam 0.25 MG tablet  Commonly known as: XANAX   0.25 mg, Oral, Nightly PRN      calcium citrate-vitamin d 200-250 MG-UNIT tablet tablet  Commonly known as: CITRACAL   1 tablet, Oral, Daily       colestipol 1 g tablet  Commonly known as: COLESTID   1 g, Oral, 3 Times Daily      Dyazide 37.5-25 MG per capsule  Generic drug: triamterene-hydrochlorothiazide   1 capsule, Oral, Daily      esomeprazole 40 MG capsule  Commonly known as: nexIUM   40 mg, Oral, Every Morning Before Breakfast      gabapentin 100 MG capsule  Commonly known as: NEURONTIN   100 mg, Oral, Every Night at Bedtime      meclizine 12.5 MG tablet  Commonly known as: ANTIVERT   12.5 mg, Oral, 3 Times Daily PRN      metoprolol tartrate 100 MG tablet  Commonly known as: LOPRESSOR   50 mg, Oral, 4 Times Daily      multivitamin with minerals tablet tablet   1 tablet, Oral, Daily      Myrbetriq 25 MG tablet sustained-release 24 hour 24 hr tablet  Generic drug: Mirabegron ER   25 mg, Oral, Daily at 1100      raloxifene 60 MG tablet  Commonly known as: EVISTA   60 mg, Oral, Nightly      ranolazine 500 MG 12 hr tablet  Commonly known as: RANEXA   500 mg, Oral, 2 Times Daily      warfarin 3 MG tablet  Commonly known as: COUMADIN   3 mg, Oral, Daily             Discharge Diet:  low Na  Discharge Activity:  increase to former level as tolerated    Disposition: to home, in good condition  Follow up with Rajani Wesley MD within 1 week.    >30minutes was spent in the discharge planning and coordination process.    Signed:  Ronny Jiang MD, 12/18/2021 at 09:00 CST    Electronically signed by Ronny Jiang MD at 12/18/21 0903       Discharge Order (From admission, onward)     Start     Ordered    12/18/21 0855  Discharge patient  Once        Expected Discharge Date: 12/18/21    Discharge Disposition: Home or Self Care    Physician of Record for Attribution - Please select from Treatment Team: RAJANI WESLEY [[id number]]    Review needed by CMO to determine Physician of Record: No       Question Answer Comment   Physician of Record for Attribution - Please select from Treatment Team RAJANI WESLEY    Review needed by CMO to determine  Physician of Record No        12/18/21 0859

## 2021-12-22 ENCOUNTER — READMISSION MANAGEMENT (OUTPATIENT)
Dept: CALL CENTER | Facility: HOSPITAL | Age: 86
End: 2021-12-22

## 2021-12-23 NOTE — OUTREACH NOTE
Medical Week 1 Survey      Responses   [facility] [place] patient discharged from? [place]   Does the patient have one of the following disease processes/diagnoses(primary or secondary)? Other   Week 1 attempt successful? Yes   Call start time 1811   Call end time 1813   Meds reviewed with patient/caregiver? Yes   Is the patient having any side effects they believe may be caused by any medication additions or changes? No   Is the patient taking all medications as directed (includes completed medication regime)? Yes   Does the patient have a primary care provider?  Yes   Does the patient have an appointment with their PCP within 7 days of discharge? Yes   Comments appointment scheduled with  tomorrow   Has home health visited the patient within 72 hours of discharge? N/A   Did the patient receive a copy of their discharge instructions? Yes   Nursing interventions Reviewed instructions with patient   What is the patient's perception of their health status since discharge? Improving   Is the patient/caregiver able to teach back signs and symptoms related to disease process for when to call PCP? Yes   Is the patient/caregiver able to teach back signs and symptoms related to disease process for when to call 911? Yes   Is the patient/caregiver able to teach back the hierarchy of who to call/visit for symptoms/problems? PCP, Specialist, Home health nurse, Urgent Care, ED, 911 Yes   If the patient is a current smoker, are they able to teach back resources for cessation? Not a smoker   Week 1 call completed? Yes   Wrap up additional comments had diarrhea when she got home but doing well at the present          Christelle Flores RN

## 2022-02-22 ENCOUNTER — HOSPITAL ENCOUNTER (INPATIENT)
Facility: HOSPITAL | Age: 87
LOS: 4 days | Discharge: HOME | End: 2022-02-26
Attending: EMERGENCY MEDICINE | Admitting: FAMILY MEDICINE

## 2022-02-22 ENCOUNTER — APPOINTMENT (OUTPATIENT)
Dept: GENERAL RADIOLOGY | Facility: HOSPITAL | Age: 87
End: 2022-02-22

## 2022-02-22 DIAGNOSIS — Z74.09 IMPAIRED MOBILITY: ICD-10-CM

## 2022-02-22 DIAGNOSIS — E87.6 HYPOKALEMIA: ICD-10-CM

## 2022-02-22 DIAGNOSIS — R55 NEAR SYNCOPE: ICD-10-CM

## 2022-02-22 DIAGNOSIS — R53.1 GENERALIZED WEAKNESS: ICD-10-CM

## 2022-02-22 DIAGNOSIS — I47.1 SUPRAVENTRICULAR TACHYCARDIA: Primary | ICD-10-CM

## 2022-02-22 LAB
ALBUMIN SERPL-MCNC: 3.8 G/DL (ref 3.5–5.2)
ALBUMIN/GLOB SERPL: 1.7 G/DL
ALP SERPL-CCNC: 57 U/L (ref 39–117)
ALT SERPL W P-5'-P-CCNC: 9 U/L (ref 1–33)
ANION GAP SERPL CALCULATED.3IONS-SCNC: 12 MMOL/L (ref 5–15)
APTT PPP: 29.5 SECONDS (ref 24.1–35)
AST SERPL-CCNC: 19 U/L (ref 1–32)
BASOPHILS # BLD AUTO: 0.03 10*3/MM3 (ref 0–0.2)
BASOPHILS NFR BLD AUTO: 0.5 % (ref 0–1.5)
BILIRUB SERPL-MCNC: 0.3 MG/DL (ref 0–1.2)
BILIRUB UR QL STRIP: NEGATIVE
BUN SERPL-MCNC: 14 MG/DL (ref 8–23)
BUN/CREAT SERPL: 12.6 (ref 7–25)
CALCIUM SPEC-SCNC: 8.4 MG/DL (ref 8.2–9.6)
CHLORIDE SERPL-SCNC: 107 MMOL/L (ref 98–107)
CLARITY UR: CLEAR
CO2 SERPL-SCNC: 25 MMOL/L (ref 22–29)
COLOR UR: YELLOW
CREAT SERPL-MCNC: 1.11 MG/DL (ref 0.57–1)
D DIMER PPP FEU-MCNC: 0.48 MG/L (FEU) (ref 0–0.5)
DEPRECATED RDW RBC AUTO: 49.1 FL (ref 37–54)
EOSINOPHIL # BLD AUTO: 0.12 10*3/MM3 (ref 0–0.4)
EOSINOPHIL NFR BLD AUTO: 1.9 % (ref 0.3–6.2)
ERYTHROCYTE [DISTWIDTH] IN BLOOD BY AUTOMATED COUNT: 14.8 % (ref 12.3–15.4)
GFR SERPL CREATININE-BSD FRML MDRD: 45 ML/MIN/1.73
GLOBULIN UR ELPH-MCNC: 2.2 GM/DL
GLUCOSE SERPL-MCNC: 111 MG/DL (ref 65–99)
GLUCOSE UR STRIP-MCNC: NEGATIVE MG/DL
HCT VFR BLD AUTO: 34.1 % (ref 34–46.6)
HGB BLD-MCNC: 10.8 G/DL (ref 12–15.9)
HGB UR QL STRIP.AUTO: NEGATIVE
HOLD SPECIMEN: NORMAL
HOLD SPECIMEN: NORMAL
IMM GRANULOCYTES # BLD AUTO: 0.03 10*3/MM3 (ref 0–0.05)
IMM GRANULOCYTES NFR BLD AUTO: 0.5 % (ref 0–0.5)
INR PPP: 1.13 (ref 0.91–1.09)
INR PPP: 1.22 (ref 0.91–1.09)
KETONES UR QL STRIP: NEGATIVE
LEUKOCYTE ESTERASE UR QL STRIP.AUTO: NEGATIVE
LYMPHOCYTES # BLD AUTO: 0.59 10*3/MM3 (ref 0.7–3.1)
LYMPHOCYTES NFR BLD AUTO: 9.3 % (ref 19.6–45.3)
MAGNESIUM SERPL-MCNC: 2 MG/DL (ref 1.7–2.3)
MCH RBC QN AUTO: 28.6 PG (ref 26.6–33)
MCHC RBC AUTO-ENTMCNC: 31.7 G/DL (ref 31.5–35.7)
MCV RBC AUTO: 90.2 FL (ref 79–97)
MONOCYTES # BLD AUTO: 0.51 10*3/MM3 (ref 0.1–0.9)
MONOCYTES NFR BLD AUTO: 8.1 % (ref 5–12)
NEUTROPHILS NFR BLD AUTO: 5.04 10*3/MM3 (ref 1.7–7)
NEUTROPHILS NFR BLD AUTO: 79.7 % (ref 42.7–76)
NITRITE UR QL STRIP: NEGATIVE
NRBC BLD AUTO-RTO: 0 /100 WBC (ref 0–0.2)
NT-PROBNP SERPL-MCNC: 917.8 PG/ML (ref 0–1800)
PH UR STRIP.AUTO: 5.5 [PH] (ref 5–8)
PLATELET # BLD AUTO: 649 10*3/MM3 (ref 140–450)
PMV BLD AUTO: 10.2 FL (ref 6–12)
POTASSIUM SERPL-SCNC: 3 MMOL/L (ref 3.5–5.2)
PROT SERPL-MCNC: 6 G/DL (ref 6–8.5)
PROT UR QL STRIP: NEGATIVE
PROTHROMBIN TIME: 14.1 SECONDS (ref 11.9–14.6)
PROTHROMBIN TIME: 14.9 SECONDS (ref 11.9–14.6)
RBC # BLD AUTO: 3.78 10*6/MM3 (ref 3.77–5.28)
SARS-COV-2 RNA PNL SPEC NAA+PROBE: NOT DETECTED
SODIUM SERPL-SCNC: 144 MMOL/L (ref 136–145)
SP GR UR STRIP: <=1.005 (ref 1–1.03)
TROPONIN T SERPL-MCNC: <0.01 NG/ML (ref 0–0.03)
UROBILINOGEN UR QL STRIP: NORMAL
WBC NRBC COR # BLD: 6.32 10*3/MM3 (ref 3.4–10.8)
WHOLE BLOOD HOLD SPECIMEN: NORMAL
WHOLE BLOOD HOLD SPECIMEN: NORMAL

## 2022-02-22 PROCEDURE — 93005 ELECTROCARDIOGRAM TRACING: CPT | Performed by: EMERGENCY MEDICINE

## 2022-02-22 PROCEDURE — 83880 ASSAY OF NATRIURETIC PEPTIDE: CPT | Performed by: EMERGENCY MEDICINE

## 2022-02-22 PROCEDURE — 71045 X-RAY EXAM CHEST 1 VIEW: CPT

## 2022-02-22 PROCEDURE — 80053 COMPREHEN METABOLIC PANEL: CPT | Performed by: EMERGENCY MEDICINE

## 2022-02-22 PROCEDURE — 99284 EMERGENCY DEPT VISIT MOD MDM: CPT

## 2022-02-22 PROCEDURE — 93010 ELECTROCARDIOGRAM REPORT: CPT | Performed by: INTERNAL MEDICINE

## 2022-02-22 PROCEDURE — 85610 PROTHROMBIN TIME: CPT | Performed by: EMERGENCY MEDICINE

## 2022-02-22 PROCEDURE — 85025 COMPLETE CBC W/AUTO DIFF WBC: CPT | Performed by: EMERGENCY MEDICINE

## 2022-02-22 PROCEDURE — P9612 CATHETERIZE FOR URINE SPEC: HCPCS

## 2022-02-22 PROCEDURE — 83735 ASSAY OF MAGNESIUM: CPT | Performed by: EMERGENCY MEDICINE

## 2022-02-22 PROCEDURE — 85610 PROTHROMBIN TIME: CPT | Performed by: FAMILY MEDICINE

## 2022-02-22 PROCEDURE — 81003 URINALYSIS AUTO W/O SCOPE: CPT | Performed by: EMERGENCY MEDICINE

## 2022-02-22 PROCEDURE — 25010000002 POTASSIUM CHLORIDE PER 2 MEQ: Performed by: EMERGENCY MEDICINE

## 2022-02-22 PROCEDURE — 85730 THROMBOPLASTIN TIME PARTIAL: CPT | Performed by: EMERGENCY MEDICINE

## 2022-02-22 PROCEDURE — G0378 HOSPITAL OBSERVATION PER HR: HCPCS

## 2022-02-22 PROCEDURE — 25010000002 CEFTRIAXONE PER 250 MG: Performed by: FAMILY MEDICINE

## 2022-02-22 PROCEDURE — 84484 ASSAY OF TROPONIN QUANT: CPT | Performed by: EMERGENCY MEDICINE

## 2022-02-22 PROCEDURE — 87635 SARS-COV-2 COVID-19 AMP PRB: CPT | Performed by: EMERGENCY MEDICINE

## 2022-02-22 PROCEDURE — 85379 FIBRIN DEGRADATION QUANT: CPT | Performed by: EMERGENCY MEDICINE

## 2022-02-22 RX ORDER — POTASSIUM CHLORIDE 14.9 MG/ML
20 INJECTION INTRAVENOUS ONCE
Status: COMPLETED | OUTPATIENT
Start: 2022-02-22 | End: 2022-02-22

## 2022-02-22 RX ORDER — CHOLESTYRAMINE LIGHT 4 G/5.7G
1 POWDER, FOR SUSPENSION ORAL DAILY
Status: DISCONTINUED | OUTPATIENT
Start: 2022-02-22 | End: 2022-02-26 | Stop reason: HOSPADM

## 2022-02-22 RX ORDER — RANOLAZINE 500 MG/1
500 TABLET, EXTENDED RELEASE ORAL 2 TIMES DAILY
Status: DISCONTINUED | OUTPATIENT
Start: 2022-02-22 | End: 2022-02-26 | Stop reason: HOSPADM

## 2022-02-22 RX ORDER — WARFARIN SODIUM 2 MG/1
3 TABLET ORAL DAILY
Status: DISCONTINUED | OUTPATIENT
Start: 2022-02-22 | End: 2022-02-26 | Stop reason: HOSPADM

## 2022-02-22 RX ORDER — MULTIPLE VITAMINS W/ MINERALS TAB 9MG-400MCG
1 TAB ORAL DAILY
Status: DISCONTINUED | OUTPATIENT
Start: 2022-02-22 | End: 2022-02-26 | Stop reason: HOSPADM

## 2022-02-22 RX ORDER — SODIUM CHLORIDE 9 MG/ML
125 INJECTION, SOLUTION INTRAVENOUS CONTINUOUS
Status: DISCONTINUED | OUTPATIENT
Start: 2022-02-22 | End: 2022-02-25

## 2022-02-22 RX ORDER — PANTOPRAZOLE SODIUM 40 MG/1
40 TABLET, DELAYED RELEASE ORAL
Status: DISCONTINUED | OUTPATIENT
Start: 2022-02-23 | End: 2022-02-26 | Stop reason: HOSPADM

## 2022-02-22 RX ORDER — GABAPENTIN 100 MG/1
100 CAPSULE ORAL NIGHTLY
Status: DISCONTINUED | OUTPATIENT
Start: 2022-02-22 | End: 2022-02-26 | Stop reason: HOSPADM

## 2022-02-22 RX ORDER — LEVOTHYROXINE SODIUM 0.07 MG/1
75 TABLET ORAL
Status: DISCONTINUED | OUTPATIENT
Start: 2022-02-23 | End: 2022-02-26 | Stop reason: HOSPADM

## 2022-02-22 RX ORDER — ALPRAZOLAM 0.25 MG/1
0.25 TABLET ORAL NIGHTLY PRN
Status: DISCONTINUED | OUTPATIENT
Start: 2022-02-22 | End: 2022-02-26 | Stop reason: HOSPADM

## 2022-02-22 RX ADMIN — Medication 1 TABLET: at 18:08

## 2022-02-22 RX ADMIN — CHOLESTYRAMINE 4 G: 4 POWDER, FOR SUSPENSION ORAL at 18:08

## 2022-02-22 RX ADMIN — WARFARIN SODIUM 3 MG: 2 TABLET ORAL at 18:08

## 2022-02-22 RX ADMIN — SODIUM CHLORIDE 125 ML/HR: 9 INJECTION, SOLUTION INTRAVENOUS at 14:37

## 2022-02-22 RX ADMIN — SODIUM CHLORIDE 1 G: 900 INJECTION INTRAVENOUS at 18:08

## 2022-02-22 RX ADMIN — SODIUM CHLORIDE 125 ML/HR: 9 INJECTION, SOLUTION INTRAVENOUS at 21:53

## 2022-02-22 RX ADMIN — POTASSIUM CHLORIDE 20 MEQ: 14.9 INJECTION, SOLUTION INTRAVENOUS at 14:37

## 2022-02-22 RX ADMIN — GABAPENTIN 100 MG: 100 CAPSULE ORAL at 20:26

## 2022-02-22 RX ADMIN — RANOLAZINE 500 MG: 500 TABLET, FILM COATED, EXTENDED RELEASE ORAL at 20:26

## 2022-02-22 RX ADMIN — SODIUM CHLORIDE 500 ML: 9 INJECTION, SOLUTION INTRAVENOUS at 13:03

## 2022-02-23 LAB
ALBUMIN SERPL-MCNC: 3.7 G/DL (ref 3.5–5.2)
ALBUMIN/GLOB SERPL: 2.1 G/DL
ALP SERPL-CCNC: 53 U/L (ref 39–117)
ALT SERPL W P-5'-P-CCNC: 8 U/L (ref 1–33)
ANION GAP SERPL CALCULATED.3IONS-SCNC: 9 MMOL/L (ref 5–15)
AST SERPL-CCNC: 14 U/L (ref 1–32)
BILIRUB SERPL-MCNC: 0.3 MG/DL (ref 0–1.2)
BUN SERPL-MCNC: 12 MG/DL (ref 8–23)
BUN/CREAT SERPL: 12.1 (ref 7–25)
CALCIUM SPEC-SCNC: 8.7 MG/DL (ref 8.2–9.6)
CHLORIDE SERPL-SCNC: 110 MMOL/L (ref 98–107)
CO2 SERPL-SCNC: 27 MMOL/L (ref 22–29)
CREAT SERPL-MCNC: 0.99 MG/DL (ref 0.57–1)
DEPRECATED RDW RBC AUTO: 49.9 FL (ref 37–54)
ERYTHROCYTE [DISTWIDTH] IN BLOOD BY AUTOMATED COUNT: 14.8 % (ref 12.3–15.4)
GFR SERPL CREATININE-BSD FRML MDRD: 52 ML/MIN/1.73
GLOBULIN UR ELPH-MCNC: 1.8 GM/DL
GLUCOSE SERPL-MCNC: 79 MG/DL (ref 65–99)
HCT VFR BLD AUTO: 33.5 % (ref 34–46.6)
HGB BLD-MCNC: 10.2 G/DL (ref 12–15.9)
INR PPP: 1.15 (ref 0.91–1.09)
MCH RBC QN AUTO: 27.9 PG (ref 26.6–33)
MCHC RBC AUTO-ENTMCNC: 30.4 G/DL (ref 31.5–35.7)
MCV RBC AUTO: 91.8 FL (ref 79–97)
PLATELET # BLD AUTO: 671 10*3/MM3 (ref 140–450)
PMV BLD AUTO: 10.2 FL (ref 6–12)
POTASSIUM SERPL-SCNC: 3.8 MMOL/L (ref 3.5–5.2)
PROT SERPL-MCNC: 5.5 G/DL (ref 6–8.5)
PROTHROMBIN TIME: 14.3 SECONDS (ref 11.9–14.6)
QT INTERVAL: 336 MS
QTC INTERVAL: 444 MS
RBC # BLD AUTO: 3.65 10*6/MM3 (ref 3.77–5.28)
SODIUM SERPL-SCNC: 146 MMOL/L (ref 136–145)
WBC NRBC COR # BLD: 5.38 10*3/MM3 (ref 3.4–10.8)

## 2022-02-23 PROCEDURE — 85027 COMPLETE CBC AUTOMATED: CPT | Performed by: FAMILY MEDICINE

## 2022-02-23 PROCEDURE — 80053 COMPREHEN METABOLIC PANEL: CPT | Performed by: FAMILY MEDICINE

## 2022-02-23 PROCEDURE — 25010000002 CEFTRIAXONE PER 250 MG: Performed by: FAMILY MEDICINE

## 2022-02-23 PROCEDURE — 85610 PROTHROMBIN TIME: CPT | Performed by: FAMILY MEDICINE

## 2022-02-23 PROCEDURE — G0378 HOSPITAL OBSERVATION PER HR: HCPCS

## 2022-02-23 RX ORDER — SACCHAROMYCES BOULARDII 250 MG
500 CAPSULE ORAL DAILY
Status: DISCONTINUED | OUTPATIENT
Start: 2022-02-23 | End: 2022-02-26 | Stop reason: HOSPADM

## 2022-02-23 RX ADMIN — PANTOPRAZOLE SODIUM 40 MG: 40 TABLET, DELAYED RELEASE ORAL at 05:14

## 2022-02-23 RX ADMIN — GABAPENTIN 100 MG: 100 CAPSULE ORAL at 21:58

## 2022-02-23 RX ADMIN — LEVOTHYROXINE SODIUM 75 MCG: 75 TABLET ORAL at 05:14

## 2022-02-23 RX ADMIN — RANOLAZINE 500 MG: 500 TABLET, FILM COATED, EXTENDED RELEASE ORAL at 08:04

## 2022-02-23 RX ADMIN — CHOLESTYRAMINE 4 G: 4 POWDER, FOR SUSPENSION ORAL at 08:04

## 2022-02-23 RX ADMIN — Medication 500 MG: at 09:30

## 2022-02-23 RX ADMIN — SODIUM CHLORIDE 1 G: 900 INJECTION INTRAVENOUS at 17:08

## 2022-02-23 RX ADMIN — Medication 1 TABLET: at 08:04

## 2022-02-23 RX ADMIN — RANOLAZINE 500 MG: 500 TABLET, FILM COATED, EXTENDED RELEASE ORAL at 21:58

## 2022-02-23 RX ADMIN — WARFARIN SODIUM 3 MG: 2 TABLET ORAL at 08:04

## 2022-02-24 LAB
ANION GAP SERPL CALCULATED.3IONS-SCNC: 9 MMOL/L (ref 5–15)
BUN SERPL-MCNC: 11 MG/DL (ref 8–23)
BUN/CREAT SERPL: 9.8 (ref 7–25)
CALCIUM SPEC-SCNC: 8.9 MG/DL (ref 8.2–9.6)
CHLORIDE SERPL-SCNC: 110 MMOL/L (ref 98–107)
CO2 SERPL-SCNC: 26 MMOL/L (ref 22–29)
CREAT SERPL-MCNC: 1.12 MG/DL (ref 0.57–1)
DEPRECATED RDW RBC AUTO: 50.6 FL (ref 37–54)
ERYTHROCYTE [DISTWIDTH] IN BLOOD BY AUTOMATED COUNT: 15 % (ref 12.3–15.4)
GFR SERPL CREATININE-BSD FRML MDRD: 45 ML/MIN/1.73
GLUCOSE SERPL-MCNC: 87 MG/DL (ref 65–99)
HCT VFR BLD AUTO: 31.9 % (ref 34–46.6)
HGB BLD-MCNC: 10.1 G/DL (ref 12–15.9)
INR PPP: 1.21 (ref 0.91–1.09)
MCH RBC QN AUTO: 29.3 PG (ref 26.6–33)
MCHC RBC AUTO-ENTMCNC: 31.7 G/DL (ref 31.5–35.7)
MCV RBC AUTO: 92.5 FL (ref 79–97)
PLATELET # BLD AUTO: 651 10*3/MM3 (ref 140–450)
PMV BLD AUTO: 10.3 FL (ref 6–12)
POTASSIUM SERPL-SCNC: 4 MMOL/L (ref 3.5–5.2)
PROTHROMBIN TIME: 14.8 SECONDS (ref 11.9–14.6)
RBC # BLD AUTO: 3.45 10*6/MM3 (ref 3.77–5.28)
SODIUM SERPL-SCNC: 145 MMOL/L (ref 136–145)
TSH SERPL DL<=0.05 MIU/L-ACNC: 3.06 UIU/ML (ref 0.27–4.2)
WBC NRBC COR # BLD: 6.47 10*3/MM3 (ref 3.4–10.8)

## 2022-02-24 PROCEDURE — G0378 HOSPITAL OBSERVATION PER HR: HCPCS

## 2022-02-24 PROCEDURE — 99222 1ST HOSP IP/OBS MODERATE 55: CPT | Performed by: NURSE PRACTITIONER

## 2022-02-24 PROCEDURE — 84443 ASSAY THYROID STIM HORMONE: CPT | Performed by: NURSE PRACTITIONER

## 2022-02-24 PROCEDURE — 85027 COMPLETE CBC AUTOMATED: CPT | Performed by: FAMILY MEDICINE

## 2022-02-24 PROCEDURE — 85610 PROTHROMBIN TIME: CPT | Performed by: FAMILY MEDICINE

## 2022-02-24 PROCEDURE — 25010000002 CEFTRIAXONE PER 250 MG: Performed by: FAMILY MEDICINE

## 2022-02-24 PROCEDURE — 97162 PT EVAL MOD COMPLEX 30 MIN: CPT

## 2022-02-24 PROCEDURE — 80048 BASIC METABOLIC PNL TOTAL CA: CPT | Performed by: FAMILY MEDICINE

## 2022-02-24 RX ORDER — METOPROLOL TARTRATE 50 MG/1
50 TABLET, FILM COATED ORAL DAILY
COMMUNITY
End: 2022-02-26 | Stop reason: HOSPADM

## 2022-02-24 RX ORDER — WARFARIN SODIUM 3 MG/1
4.5 TABLET ORAL
COMMUNITY

## 2022-02-24 RX ADMIN — SODIUM CHLORIDE 125 ML/HR: 9 INJECTION, SOLUTION INTRAVENOUS at 09:27

## 2022-02-24 RX ADMIN — WARFARIN SODIUM 3 MG: 2 TABLET ORAL at 09:26

## 2022-02-24 RX ADMIN — METOPROLOL TARTRATE 25 MG: 25 TABLET, FILM COATED ORAL at 14:00

## 2022-02-24 RX ADMIN — CHOLESTYRAMINE 4 G: 4 POWDER, FOR SUSPENSION ORAL at 09:27

## 2022-02-24 RX ADMIN — RANOLAZINE 500 MG: 500 TABLET, FILM COATED, EXTENDED RELEASE ORAL at 09:27

## 2022-02-24 RX ADMIN — Medication 500 MG: at 09:26

## 2022-02-24 RX ADMIN — METOPROLOL TARTRATE 25 MG: 25 TABLET, FILM COATED ORAL at 20:21

## 2022-02-24 RX ADMIN — GABAPENTIN 100 MG: 100 CAPSULE ORAL at 20:21

## 2022-02-24 RX ADMIN — SODIUM CHLORIDE 125 ML/HR: 9 INJECTION, SOLUTION INTRAVENOUS at 18:06

## 2022-02-24 RX ADMIN — SODIUM CHLORIDE 1 G: 900 INJECTION INTRAVENOUS at 18:06

## 2022-02-24 RX ADMIN — LEVOTHYROXINE SODIUM 75 MCG: 75 TABLET ORAL at 06:13

## 2022-02-24 RX ADMIN — Medication 1 TABLET: at 09:27

## 2022-02-24 RX ADMIN — RANOLAZINE 500 MG: 500 TABLET, FILM COATED, EXTENDED RELEASE ORAL at 20:21

## 2022-02-24 RX ADMIN — PANTOPRAZOLE SODIUM 40 MG: 40 TABLET, DELAYED RELEASE ORAL at 06:13

## 2022-02-25 PROBLEM — Z87.19 HISTORY OF GASTROINTESTINAL BLEEDING: Chronic | Status: ACTIVE | Noted: 2022-02-25

## 2022-02-25 PROBLEM — I10 ESSENTIAL HYPERTENSION: Status: ACTIVE | Noted: 2021-02-02

## 2022-02-25 PROBLEM — D75.839 THROMBOCYTOSIS: Status: ACTIVE | Noted: 2020-12-07

## 2022-02-25 LAB
INR PPP: 1.32 (ref 0.91–1.09)
PROTHROMBIN TIME: 15.9 SECONDS (ref 11.9–14.6)

## 2022-02-25 PROCEDURE — 99232 SBSQ HOSP IP/OBS MODERATE 35: CPT | Performed by: NURSE PRACTITIONER

## 2022-02-25 PROCEDURE — 97116 GAIT TRAINING THERAPY: CPT

## 2022-02-25 PROCEDURE — 85610 PROTHROMBIN TIME: CPT | Performed by: FAMILY MEDICINE

## 2022-02-25 RX ORDER — LOPERAMIDE HYDROCHLORIDE 2 MG/1
2 CAPSULE ORAL NIGHTLY
Status: DISCONTINUED | OUTPATIENT
Start: 2022-02-25 | End: 2022-02-26 | Stop reason: HOSPADM

## 2022-02-25 RX ADMIN — Medication 1 TABLET: at 09:03

## 2022-02-25 RX ADMIN — GABAPENTIN 100 MG: 100 CAPSULE ORAL at 21:31

## 2022-02-25 RX ADMIN — METOPROLOL TARTRATE 25 MG: 25 TABLET, FILM COATED ORAL at 09:03

## 2022-02-25 RX ADMIN — LEVOTHYROXINE SODIUM 75 MCG: 75 TABLET ORAL at 05:11

## 2022-02-25 RX ADMIN — RANOLAZINE 500 MG: 500 TABLET, FILM COATED, EXTENDED RELEASE ORAL at 21:31

## 2022-02-25 RX ADMIN — WARFARIN SODIUM 3 MG: 2 TABLET ORAL at 09:03

## 2022-02-25 RX ADMIN — RANOLAZINE 500 MG: 500 TABLET, FILM COATED, EXTENDED RELEASE ORAL at 09:03

## 2022-02-25 RX ADMIN — Medication 500 MG: at 09:03

## 2022-02-25 RX ADMIN — PANTOPRAZOLE SODIUM 40 MG: 40 TABLET, DELAYED RELEASE ORAL at 05:11

## 2022-02-25 RX ADMIN — SODIUM CHLORIDE 125 ML/HR: 9 INJECTION, SOLUTION INTRAVENOUS at 03:45

## 2022-02-25 RX ADMIN — LOPERAMIDE HYDROCHLORIDE 2 MG: 2 CAPSULE ORAL at 21:31

## 2022-02-25 RX ADMIN — CHOLESTYRAMINE 4 G: 4 POWDER, FOR SUSPENSION ORAL at 09:03

## 2022-02-25 RX ADMIN — METOPROLOL TARTRATE 25 MG: 25 TABLET, FILM COATED ORAL at 21:31

## 2022-02-26 ENCOUNTER — READMISSION MANAGEMENT (OUTPATIENT)
Dept: CALL CENTER | Facility: HOSPITAL | Age: 87
End: 2022-02-26

## 2022-02-26 VITALS
WEIGHT: 129.4 LBS | OXYGEN SATURATION: 96 % | TEMPERATURE: 98.2 F | HEIGHT: 62 IN | DIASTOLIC BLOOD PRESSURE: 60 MMHG | BODY MASS INDEX: 23.81 KG/M2 | HEART RATE: 74 BPM | RESPIRATION RATE: 16 BRPM | SYSTOLIC BLOOD PRESSURE: 129 MMHG

## 2022-02-26 PROBLEM — E86.0 DEHYDRATION: Status: ACTIVE | Noted: 2022-02-26

## 2022-02-26 LAB
INR PPP: 1.36 (ref 0.91–1.09)
PROTHROMBIN TIME: 16.2 SECONDS (ref 11.9–14.6)

## 2022-02-26 PROCEDURE — 97116 GAIT TRAINING THERAPY: CPT

## 2022-02-26 PROCEDURE — 85610 PROTHROMBIN TIME: CPT | Performed by: FAMILY MEDICINE

## 2022-02-26 PROCEDURE — 97530 THERAPEUTIC ACTIVITIES: CPT

## 2022-02-26 RX ORDER — LOPERAMIDE HYDROCHLORIDE 2 MG/1
2 CAPSULE ORAL NIGHTLY
Qty: 90 CAPSULE | Refills: 0 | Status: SHIPPED | OUTPATIENT
Start: 2022-02-26

## 2022-02-26 RX ORDER — LOPERAMIDE HYDROCHLORIDE 2 MG/1
2 CAPSULE ORAL NIGHTLY
Qty: 90 CAPSULE | Refills: 0 | Status: SHIPPED | OUTPATIENT
Start: 2022-02-26 | End: 2022-02-26

## 2022-02-26 RX ADMIN — Medication 1 TABLET: at 08:54

## 2022-02-26 RX ADMIN — METOPROLOL TARTRATE 25 MG: 25 TABLET, FILM COATED ORAL at 08:54

## 2022-02-26 RX ADMIN — RANOLAZINE 500 MG: 500 TABLET, FILM COATED, EXTENDED RELEASE ORAL at 08:55

## 2022-02-26 RX ADMIN — PANTOPRAZOLE SODIUM 40 MG: 40 TABLET, DELAYED RELEASE ORAL at 06:36

## 2022-02-26 RX ADMIN — Medication 1 TABLET: at 08:55

## 2022-02-26 RX ADMIN — CHOLESTYRAMINE 4 G: 4 POWDER, FOR SUSPENSION ORAL at 08:55

## 2022-02-26 RX ADMIN — WARFARIN SODIUM 3 MG: 2 TABLET ORAL at 08:54

## 2022-02-26 RX ADMIN — LEVOTHYROXINE SODIUM 75 MCG: 75 TABLET ORAL at 06:36

## 2022-02-26 RX ADMIN — Medication 500 MG: at 08:54

## 2022-02-27 NOTE — OUTREACH NOTE
Prep Survey      Responses   [facility] facility patient discharged from? [place]   Is LACE score < 7 ? No   Emergency Room discharge w/ pulse ox? No   Eligibility Readm Mgmt   Discharge diagnosis Supraventricular tachycardia    Does the patient have one of the following disease processes/diagnoses(primary or secondary)? Other   Does the patient have Home health ordered? No   Is there a DME ordered? No   Prep survey completed? Yes          Sugar Santos RN

## 2022-03-01 ENCOUNTER — READMISSION MANAGEMENT (OUTPATIENT)
Dept: CALL CENTER | Facility: HOSPITAL | Age: 87
End: 2022-03-01

## 2022-03-01 NOTE — OUTREACH NOTE
Medical Week 1 Survey      Responses   [facility] patient discharged from? [place]   Does the patient have one of the following disease processes/diagnoses(primary or secondary)? Other   Week 1 attempt successful? Yes   Call start time 1623   Call end time 1628   Discharge diagnosis Supraventricular tachycardia, dehydration/diarrhea   Is patient permission given to speak with other caregiver? Yes   List who call center can speak with Catie, daughter   Person spoke with today (if not patient) and relationship Catie, daughter   Meds reviewed with patient/caregiver? Yes   Does the patient have all medications ordered at discharge? Yes   Is the patient taking all medications as directed (includes completed medication regime)? Yes   Does the patient have a primary care provider?  Yes   Comments regarding PCP PCP Dr Lyons. have been in contact with PCP by phone.    Has the patient kept scheduled appointments due by today? N/A   Has home health visited the patient within 72 hours of discharge? N/A   Psychosocial issues? No   Did the patient receive a copy of their discharge instructions? Yes   Nursing interventions Reviewed instructions with patient   What is the patient's perception of their health status since discharge? Improving   Is the patient/caregiver able to teach back signs and symptoms related to disease process for when to call PCP? Yes   Is the patient/caregiver able to teach back signs and symptoms related to disease process for when to call 911? Yes   Is the patient/caregiver able to teach back the hierarchy of who to call/visit for symptoms/problems? PCP, Specialist, Home health nurse, Urgent Care, ED, 911 Yes   If the patient is a current smoker, are they able to teach back resources for cessation? Not a smoker   Week 1 call completed? Yes          Meghna Michaels RN

## 2022-03-10 ENCOUNTER — READMISSION MANAGEMENT (OUTPATIENT)
Dept: CALL CENTER | Facility: HOSPITAL | Age: 87
End: 2022-03-10

## 2022-03-10 NOTE — OUTREACH NOTE
Medical Week 2 Survey    Flowsheet Row Responses   [facility] patient discharged from? [place]   Does the patient have one of the following disease processes/diagnoses(primary or secondary)? Other   Week 2 attempt successful? Yes   Call start time 1640   Discharge diagnosis Supraventricular tachycardia, dehydration/diarrhea   Call end time 1644   Is patient permission given to speak with other caregiver? Yes   Person spoke with today (if not patient) and relationship Raina and patient   Meds reviewed with patient/caregiver? Yes   Is the patient having any side effects they believe may be caused by any medication additions or changes? No   Does the patient have all medications ordered at discharge? Yes   Is the patient taking all medications as directed (includes completed medication regime)? Yes   Does the patient have a primary care provider?  Yes   Does the patient have an appointment with their PCP within 7 days of discharge? Yes   Has the patient kept scheduled appointments due by today? Yes   Has home health visited the patient within 72 hours of discharge? N/A   Psychosocial issues? No   Did the patient receive a copy of their discharge instructions? Yes   Nursing interventions Reviewed instructions with patient   What is the patient's perception of their health status since discharge? Improving   Is the patient/caregiver able to teach back signs and symptoms related to disease process for when to call PCP? Yes   Is the patient/caregiver able to teach back signs and symptoms related to disease process for when to call 911? Yes   Is the patient/caregiver able to teach back the hierarchy of who to call/visit for symptoms/problems? PCP, Specialist, Home health nurse, Urgent Care, ED, 911 Yes   If the patient is a current smoker, are they able to teach back resources for cessation? Not a smoker   Week 2 Call Completed? Yes   Wrap up additional comments Doing much better. Diarrhea improved. No needs voiced            JERARDO ACEVEDO - Registered Nurse

## 2022-03-21 ENCOUNTER — READMISSION MANAGEMENT (OUTPATIENT)
Dept: CALL CENTER | Facility: HOSPITAL | Age: 87
End: 2022-03-21

## 2022-03-21 NOTE — OUTREACH NOTE
Medical Week 3 Survey    Flowsheet Row Responses   [facility] patient discharged from? [place]   Does the patient have one of the following disease processes/diagnoses(primary or secondary)? Other   Week 3 attempt successful? Yes   Call start time 1215   Call end time 1220   Discharge diagnosis Supraventricular tachycardia, dehydration/diarrhea   Meds reviewed with patient/caregiver? Yes   Is the patient taking all medications as directed (includes completed medication regime)? Yes   Has the patient kept scheduled appointments due by today? Yes   What is the patient's perception of their health status since discharge? Improving  [Pt reports her vision is blurry today. ]   Week 3 Call Completed? Yes          MARGIE ACEVEDO - Registered Nurse

## 2022-03-29 LAB
BACTERIA: ABNORMAL /HPF
BILIRUBIN URINE: NEGATIVE
BLOOD, URINE: ABNORMAL
CLARITY: ABNORMAL
COLOR: YELLOW
CRYSTALS, UA: ABNORMAL /HPF
EPITHELIAL CELLS, UA: 0 /HPF (ref 0–5)
GLUCOSE URINE: NEGATIVE MG/DL
HYALINE CASTS: 0 /HPF (ref 0–8)
KETONES, URINE: NEGATIVE MG/DL
LEUKOCYTE ESTERASE, URINE: ABNORMAL
NITRITE, URINE: NEGATIVE
PH UA: 6 (ref 5–8)
PROTEIN UA: NEGATIVE MG/DL
RBC UA: 1 /HPF (ref 0–4)
SPECIFIC GRAVITY UA: 1.01 (ref 1–1.03)
UROBILINOGEN, URINE: 0.2 E.U./DL
WBC UA: 249 /HPF (ref 0–5)

## 2022-03-30 ENCOUNTER — READMISSION MANAGEMENT (OUTPATIENT)
Dept: CALL CENTER | Facility: HOSPITAL | Age: 87
End: 2022-03-30

## 2022-03-30 NOTE — OUTREACH NOTE
Medical Week 4 Survey    Flowsheet Row Responses   [facility] patient discharged from? [place]   Does the patient have one of the following disease processes/diagnoses(primary or secondary)? Other   Week 4 attempt successful? Yes   Call start time 1451   Call end time 1455   Discharge diagnosis Supraventricular tachycardia, dehydration/diarrhea   Person spoke with today (if not patient) and relationship Raina and patient   Meds reviewed with patient/caregiver? Yes   Is the patient taking all medications as directed (includes completed medication regime)? Yes   Has the patient kept scheduled appointments due by today? Yes   Comments Pt saw PCP yesterday for urine specimen   Is the patient still receiving Home Health Services? N/A   Psychosocial issues? No   What is the patient's perception of their health status since discharge? Improving   Is the patient/caregiver able to teach back the hierarchy of who to call/visit for symptoms/problems? PCP, Specialist, Home health nurse, Urgent Care, ED, 911 Yes   If the patient is a current smoker, are they able to teach back resources for cessation? Not a smoker   Additional teach back comments Pt has a UTI, cutlure has been done, waiting for result to recieve abx.   Week 4 Call Completed? Yes   Would the patient like one additional call? No   Graduated Yes   Did the patient feel the follow up calls were helpful during their recovery period? Yes   Was the number of calls appropriate? Yes   Wrap up additional comments Improving, but, currently has a UTI.  providers are testing and will order abx within the next day.          SYDNEY DOOLEY - Registered Nurse

## 2022-03-31 LAB
ORGANISM: ABNORMAL
URINE CULTURE, ROUTINE: ABNORMAL
URINE CULTURE, ROUTINE: ABNORMAL

## 2022-04-01 LAB
QT INTERVAL: 194 MS
QTC INTERVAL: 366 MS

## 2022-04-26 LAB
BILIRUBIN URINE: NEGATIVE
BLOOD, URINE: NEGATIVE
CLARITY: CLEAR
COLOR: YELLOW
GLUCOSE URINE: NEGATIVE MG/DL
KETONES, URINE: NEGATIVE MG/DL
LEUKOCYTE ESTERASE, URINE: NEGATIVE
NITRITE, URINE: NEGATIVE
PH UA: 5 (ref 5–8)
PROTEIN UA: NEGATIVE MG/DL
SPECIFIC GRAVITY UA: 1.01 (ref 1–1.03)
UROBILINOGEN, URINE: 0.2 E.U./DL

## 2024-04-11 NOTE — PLAN OF CARE
Problem: Patient Care Overview  Goal: Plan of Care Review  Outcome: Ongoing (interventions implemented as appropriate)   09/05/19 9630   Coping/Psychosocial   Plan of Care Reviewed With patient   Plan of Care Review   Progress improving   OTHER   Outcome Summary Pt a& o x4. Afebrile. Denies any pain or discomfort. Contact precautions in place. Continues to have urinary frequency et urgency. Abx as ordered. Will cont to monitor.        Problem: Skin Injury Risk (Adult)  Goal: Identify Related Risk Factors and Signs and Symptoms  Outcome: Ongoing (interventions implemented as appropriate)    Goal: Skin Health and Integrity  Outcome: Ongoing (interventions implemented as appropriate)      Problem: Fall Risk (Adult)  Goal: Identify Related Risk Factors and Signs and Symptoms  Outcome: Ongoing (interventions implemented as appropriate)      Problem: Urinary Tract Infection (Adult)  Goal: Signs and Symptoms of Listed Potential Problems Will be Absent, Minimized or Managed (Urinary Tract Infection)  Outcome: Ongoing (interventions implemented as appropriate)         no